# Patient Record
Sex: MALE | Race: WHITE | NOT HISPANIC OR LATINO | Employment: FULL TIME | URBAN - METROPOLITAN AREA
[De-identification: names, ages, dates, MRNs, and addresses within clinical notes are randomized per-mention and may not be internally consistent; named-entity substitution may affect disease eponyms.]

---

## 2017-10-12 ENCOUNTER — OFFICE VISIT (OUTPATIENT)
Dept: FAMILY MEDICINE | Facility: CLINIC | Age: 42
End: 2017-10-12
Payer: COMMERCIAL

## 2017-10-12 VITALS
WEIGHT: 192 LBS | SYSTOLIC BLOOD PRESSURE: 130 MMHG | TEMPERATURE: 98.8 F | HEART RATE: 67 BPM | DIASTOLIC BLOOD PRESSURE: 82 MMHG | OXYGEN SATURATION: 98 % | HEIGHT: 73 IN | BODY MASS INDEX: 25.45 KG/M2

## 2017-10-12 DIAGNOSIS — E78.1 HYPERTRIGLYCERIDEMIA: ICD-10-CM

## 2017-10-12 DIAGNOSIS — M10.061 ACUTE IDIOPATHIC GOUT OF RIGHT KNEE: ICD-10-CM

## 2017-10-12 DIAGNOSIS — M25.561 PAIN AND SWELLING OF RIGHT KNEE: Primary | ICD-10-CM

## 2017-10-12 DIAGNOSIS — M25.461 PAIN AND SWELLING OF RIGHT KNEE: Primary | ICD-10-CM

## 2017-10-12 LAB
CRP SERPL-MCNC: 10.1 MG/L (ref 0–8)
URATE SERPL-MCNC: 10.2 MG/DL (ref 3.5–7.2)

## 2017-10-12 PROCEDURE — 86140 C-REACTIVE PROTEIN: CPT | Performed by: INTERNAL MEDICINE

## 2017-10-12 PROCEDURE — 86038 ANTINUCLEAR ANTIBODIES: CPT | Performed by: INTERNAL MEDICINE

## 2017-10-12 PROCEDURE — 86618 LYME DISEASE ANTIBODY: CPT | Performed by: INTERNAL MEDICINE

## 2017-10-12 PROCEDURE — 36415 COLL VENOUS BLD VENIPUNCTURE: CPT | Performed by: INTERNAL MEDICINE

## 2017-10-12 PROCEDURE — 99214 OFFICE O/P EST MOD 30 MIN: CPT | Performed by: INTERNAL MEDICINE

## 2017-10-12 PROCEDURE — 84550 ASSAY OF BLOOD/URIC ACID: CPT | Performed by: INTERNAL MEDICINE

## 2017-10-12 RX ORDER — METHYLPREDNISOLONE 4 MG
TABLET, DOSE PACK ORAL
Qty: 21 TABLET | Refills: 2 | Status: SHIPPED | OUTPATIENT
Start: 2017-10-12 | End: 2019-07-01

## 2017-10-12 NOTE — LETTER
October 16, 2017      Stevie Black  [address]  [place] [address]            Dear Stevie Black    Lab tests show no evidence of Lyme disease or rheumatoid arthritis.   Continue Medrol Dosepak, Colchicine and Indomethacin as prescribed.      Enclosed is a copy of the results.  It was a pleasure to see you at your last appointment.    If you have any questions or concerns, please call myself or my nurse at [phone].      Sincerely,      Zachary Dale MD/BRIANA Milligan MA      Results for orders placed or performed in visit on 10/12/17   Lyme Disease Natali with reflex to WB Serum   Result Value Ref Range    Lyme Disease Antibodies Serum 0.12 0.00 - 0.89   Anti Nuclear Natali IgG by IFA with Reflex   Result Value Ref Range    GILDARDO interpretation Negative NEG^Negative   CRP, inflammation   Result Value Ref Range    CRP Inflammation 10.1 (H) 0.0 - 8.0 mg/L   Uric acid   Result Value Ref Range    Uric Acid 10.2 (H) 3.5 - 7.2 mg/dL

## 2017-10-12 NOTE — NURSING NOTE
"Chief Complaint   Patient presents with     Musculoskeletal Problem     right knee pain       Initial /82 (BP Location: Left arm, Patient Position: Chair, Cuff Size: Adult Regular)  Pulse 67  Temp 98.8  F (37.1  C) (Oral)  Ht 6' 0.75\" (1.848 m)  Wt 192 lb (87.1 kg)  SpO2 98%  BMI 25.51 kg/m2 Estimated body mass index is 25.51 kg/(m^2) as calculated from the following:    Height as of this encounter: 6' 0.75\" (1.848 m).    Weight as of this encounter: 192 lb (87.1 kg).  Medication Reconciliation: complete     Pa Sean Milligan MA      "

## 2017-10-12 NOTE — Clinical Note
Lipid panel 10/11/2017 (Biometric screening at work) Total cholesterol=239 HDL=35 Triglycerides=427 LDL=cannot be calculated Non HDL cholesterol=204 Total cholesterol/HDL ratio=6.7

## 2017-10-12 NOTE — PROGRESS NOTES
SUBJECTIVE:   Stevie Black is a 42 year old male who presents to clinic today for the following health issues:    Joint Pain    Onset: 2-3 weeks    Description: Woke up with swelling and pain of right knee.   Location: right knee  Character: pressure    Intensity: mild    Progression of Symptoms: worse, especially when kneeling or flexing knee.    Accompanying Signs & Symptoms:  Other symptoms: weakness of right knee and swelling    History:   Previous similar pain: no       Precipitating factors: History of gout 5 years ago. No maintenance treatment.  Trauma or overuse: no     Alleviating factors:             None  Therapies Tried and outcome: ibuprofen, no relief      Problem list and histories reviewed & adjusted, as indicated.  Additional history: as documented    Patient Active Problem List   Diagnosis     CARDIOVASCULAR SCREENING; LDL GOAL LESS THAN 160     Gout     Infectious mononucleosis     Asymptomatic cholelithiasis     Hypertriglyceridemia     History reviewed. No pertinent surgical history.    Social History   Substance Use Topics     Smoking status: Never Smoker     Smokeless tobacco: Never Used     Alcohol use Yes      Comment: socially     Family History   Problem Relation Age of Onset     CANCER Maternal Grandmother      CANCER Maternal Grandfather          No Known Allergies  Recent Labs   Lab Test  11/25/14   1047  11/14/14   0729   ALT  67  401*   CR   --   1.00   GFRESTIMATED   --   83   GFRESTBLACK   --   >90   GFR Calc     POTASSIUM   --   4.3      BP Readings from Last 3 Encounters:   10/12/17 130/82   06/06/16 107/72   12/05/14 124/70    Wt Readings from Last 3 Encounters:   10/12/17 192 lb (87.1 kg)   06/06/16 183 lb 6.4 oz (83.2 kg)   12/05/14 165 lb 9.6 oz (75.1 kg)                 ROS:  C: NEGATIVE for fever, chills, change in weight  I: NEGATIVE for worrisome rashes, moles or lesions  E: NEGATIVE for vision changes or irritation  E/M: NEGATIVE for ear, mouth and  "throat problems  R: NEGATIVE for significant cough or SOB  CV: NEGATIVE for chest pain, palpitations or peripheral edema  GI: NEGATIVE for nausea, abdominal pain, heartburn, or change in bowel habits  : NEGATIVE for frequency, dysuria, or hematuria  MUSCULOSKELETAL:As above.  N: NEGATIVE for weakness, dizziness or paresthesias  E: NEGATIVE for temperature intolerance, skin/hair changes  H: NEGATIVE for bleeding problems  P: NEGATIVE for changes in mood or affect    OBJECTIVE:     /82 (BP Location: Left arm, Patient Position: Chair, Cuff Size: Adult Regular)  Pulse 67  Temp 98.8  F (37.1  C) (Oral)  Ht 6' 0.75\" (1.848 m)  Wt 192 lb (87.1 kg)  SpO2 98%  BMI 25.51 kg/m2  Body mass index is 25.51 kg/(m^2).  GENERAL: healthy, alert and no distress  EYES: Eyes grossly normal to inspection and conjunctivae and sclerae normal  HENT: normal cephalic/atraumatic and oral mucous membranes moist  CV: regular rate and rhythm, normal S1 S2, no S3 or S4, no murmur, click or rub, no peripheral edema and peripheral pulses strong  MS: Swelling of right knee, non-tender on palpation without limited active ROM.  SKIN: no suspicious lesions or rashes  NEURO: Normal strength and tone, mentation intact and speech normal  PSYCH: mentation appears normal, affect normal/bright    Diagnostic Test Results:  Results for orders placed or performed in visit on 10/12/17 (from the past 24 hour(s))   CRP, inflammation   Result Value Ref Range    CRP Inflammation 10.1 (H) 0.0 - 8.0 mg/L   Uric acid   Result Value Ref Range    Uric Acid 10.2 (H) 3.5 - 7.2 mg/dL       ASSESSMENT/PLAN:     (M25.561,  M25.461) Pain and swelling of right knee  (primary encounter diagnosis)  Comment: Multifactorial. No preceding trauma. Overuse is possible due to years of playing softball.  Plan: MR Knee Right w/o Contrast, ORTHOPEDICS ADULT         REFERRAL, methylPREDNISolone (MEDROL DOSEPAK) 4        MG tablet, Lyme Disease Natali with reflex to WB         " Serum, Anti Nuclear Natali IgG by IFA with Reflex,        CRP, inflammation, Uric acid            (E78.1) Hypertriglyceridemia  Comment: Noted from recent biometric screening at work.  Plan: Start over-the-counter fish oil capsules.    (M10.061) Acute idiopathic gout of right knee  Comment: Associated with high uric acid and high CRP level. Start off with Medrol Dosepak followed by 15 days of Colchicine plus Indomethacine.  Plan: colchicine (COLCRYS) 0.6 MG tablet,         indomethacin (INDOCIN) 50 MG capsule              Follow-up visit if condition worsens.    Zachary Dale MD  [facility]

## 2017-10-12 NOTE — MR AVS SNAPSHOT
After Visit Summary   10/12/2017    Stevie Black    MRN: [id number]           Patient Information     Date Of Birth          1975        Visit Information        Provider Department      10/12/2017 2:40 PM Zachary Dale MD [facility]        Today's Diagnoses     Pain and swelling of right knee    -  1      Care Instructions    This summary includes the important diagnoses, test, medications and other important parts of your medical history.  Below are a few good we sites you can use to learn more about these.     Www.Wecash.Semant.io : Up to date and easily searchable information on multiple topics.  Www.Wecash.Semant.io/Pharmacy/c_539084.asp : [facility] $4.99 medications  Www.Forter.gov : medication info, interactive tutorials, watch real surgeries online  Www.familydoctor.org : good info from the Academy of Family Physicians  Www.TableConnect GmbH.Angstro : good info from the [facility]  Www.cdc.gov : public health info, travel advisories, epidemics (H1N1)  Www.aap.org : children's health info, normal development, vaccinations  Www.health.[facility].[place].us : [place] dept of heatlh, public health issues in [place], N1N1    Based on your medical history and these are the current health maintenance or preventive care services that you are due for (some may have been done at this visit:)  Health Maintenance Due   Topic Date Due     LIPID SCREEN Q5 YR MALE (SYSTEM ASSIGNED)  02/10/2010     INFLUENZA VACCINE (SYSTEM ASSIGNED)  09/01/2017     =================================================================================  Normal Values   Blood pressure  <140/90 for most adults    <130/80 for some chronic diseases (ask your care team about yours)    BMI (body mass index)  18.5-25 kg/m2 (based on height and weight)     Thank you for visiting [facility]    Normal or non-critical lab and imaging results will be communicated to you by MyChart, letter or phone within 7  days.  If you do not hear from us within 10 days, please call the clinic. If you have a critical or abnormal lab result, we will notify you by phone as soon as possible.     If you have any questions regarding your visit please contact:     Team Comfort:   Clinic Hours Telephone Number   Dr. Babar Burton  Melissa Hartev   7am-5pm  Monday - Friday [phone]  Hansel MATIAS   Pharmacy 8am-8pm Monday-Thursday      8am-6pm Friday  9am-5pm Saturday-Sunday [phone]   Urgent Care 11am-8pm Monday-Friday        9am-5pm Saturday-Sunday [phone]     After hours, weekend or if you need to make an appointment with your primary provider please call [phone].   After Hours nurse advise: call [facility]: [phone]    Medication Refills:  Call your pharmacy and they will forward the refill to us. Please allow 3 business days for your refills to be completed.    Use Fit Fugitives (secure email communication and access to your chart) to send your primary care provider a message or make an appointment. Ask someone on your Team how to sign up for Fit Fugitives. To log on to Covercake or for more information in [place] please visit the website at [email or web address].  As of October 8, 2013, all password changes, disabled accounts, or ID changes in Fit Fugitives/MyHealth will be done by our Access Services Department.   If you need help with your account or password, call: [phone]. Clinic staff no longer has the ability to change passwords.             Follow-ups after your visit        Additional Services     ORTHOPEDICS ADULT REFERRAL       Your provider has referred you to: [facility]: [facility] [phone]    [email or web address]    Please be aware that coverage of these services is subject to the terms and limitations of your health insurance plan.  Call member services at your health plan with  "any benefit or coverage questions.      Please bring the following to your appointment:    >>   Any x-rays, CTs or MRIs which have been performed.  Contact the facility where they were done to arrange for  prior to your scheduled appointment.    >>   List of current medications   >>   This referral request   >>   Any documents/labs given to you for this referral                  Future tests that were ordered for you today     Open Future Orders        Priority Expected Expires Ordered    MR Knee Right w/o Contrast Routine  10/12/2018 10/12/2017    ORTHOPEDICS ADULT REFERRAL Routine  10/12/2018 10/12/2017            Who to contact     If you have questions or need follow up information about today's clinic visit or your schedule please contact [facility] directly at [phone].  Normal or non-critical lab and imaging results will be communicated to you by MyChart, letter or phone within 4 business days after the clinic has received the results. If you do not hear from us within 7 days, please contact the clinic through Vertive ([email or web address])hart or phone. If you have a critical or abnormal lab result, we will notify you by phone as soon as possible.  Submit refill requests through India Property Online or call your pharmacy and they will forward the refill request to us. Please allow 3 business days for your refill to be completed.          Additional Information About Your Visit        Vertive ([email or web address])harScreenie Information     India Property Online lets you send messages to your doctor, view your test results, renew your prescriptions, schedule appointments and more. To sign up, go to [email or web address] . Click on \"Log in\" on the left side of the screen, which will take you to the Welcome page. Then click on \"Sign up Now\" on the right side of the page.     You will be asked to enter the access code listed below, as well as some personal information. Please follow the directions to create your username and password.     Your access code is: " "[email or web address]  Expires: 1/10/2018  4:00 PM     Your access code will  in 90 days. If you need help or a new code, please call your [facility] clinic or [phone].        Care EveryWhere ID     This is your Care EveryWhere ID. This could be used by other organizations to access your [facility] medical records  [id number]        Your Vitals Were     Pulse Temperature Height Pulse Oximetry BMI (Body Mass Index)       67 98.8  F (37.1  C) (Oral) 6' 0.75\" (1.848 m) 98% 25.51 kg/m2        Blood Pressure from Last 3 Encounters:   10/12/17 130/82   16 107/72   14 124/70    Weight from Last 3 Encounters:   10/12/17 192 lb (87.1 kg)   16 183 lb 6.4 oz (83.2 kg)   14 165 lb 9.6 oz (75.1 kg)              We Performed the Following     Anti Nuclear Natali IgG by IFA with Reflex     Lyme Disease Natali with reflex to WB Serum          Today's Medication Changes          These changes are accurate as of: 10/12/17  4:00 PM.  If you have any questions, ask your nurse or doctor.               Start taking these medicines.        Dose/Directions    methylPREDNISolone 4 MG tablet   Commonly known as:  MEDROL DOSEPAK   Used for:  Pain and swelling of right knee   Started by:  Zachary Dale MD        Follow package instructions   Quantity:  21 tablet   Refills:  2            Where to get your medicines      These medications were sent to [id number] [place] [id number] [address] [address]     Phone:  [phone]     methylPREDNISolone 4 MG tablet                Primary Care Provider    None Specified       No primary provider on file.        Equal Access to Services     [facility] AH: Tiffanie Petersen, wamiriamda lushyadaha, qaybta kaalmada jennifer, cornelio mahoney. [facility] [phone].    ATENCIÓN: Si habla español, tiene a banegas disposición servicios gratuitos de asistencia lingüística. Llame al " [phone].    We comply with applicable federal civil rights laws and [place] laws. We do not discriminate on the basis of race, color, national origin, age, disability, sex, sexual orientation, or gender identity.            Thank you!     Thank you for choosing [facility]  for your care. Our goal is always to provide you with excellent care. Hearing back from our patients is one way we can continue to improve our services. Please take a few minutes to complete the written survey that you may receive in the mail after your visit with us. Thank you!             Your Updated Medication List - Protect others around you: Learn how to safely use, store and throw away your medicines at www.disposemymeds.org.          This list is accurate as of: 10/12/17  4:00 PM.  Always use your most recent med list.                   Brand Name Dispense Instructions for use Diagnosis    fluticasone 50 MCG/ACT spray    FLONASE    3 Package    Spray 1-2 sprays into both nostrils daily    Allergic rhinitis       ibuprofen 200 MG capsule     120 capsule    600 mg (3 tablets) po every 6 hours prn elbow and thumb pain    Thumb pain, left, Medial epicondylitis of elbow, right       methylPREDNISolone 4 MG tablet    MEDROL DOSEPAK    21 tablet    Follow package instructions    Pain and swelling of right knee

## 2017-10-12 NOTE — PATIENT INSTRUCTIONS
This summary includes the important diagnoses, test, medications and other important parts of your medical history.  Below are a few good we sites you can use to learn more about these.     Www.FiFully.org : Up to date and easily searchable information on multiple topics.  Www.FiFully.org/Pharmacy/c_539084.asp : [facility] $4.99 medications  Www.medlineplus.gov : medication info, interactive tutorials, watch real surgeries online  Www.familydoctor.org : good info from the Academy of Family Physicians  Www.CO-Valueinic.ProtonMedia : good info from the [facility]  Www.cdc.gov : public health info, travel advisories, epidemics (H1N1)  Www.aap.org : children's health info, normal development, vaccinations  Www.health.[facility].[place].us : [place] dept of heat, public health issues in [place], N1N1    Based on your medical history and these are the current health maintenance or preventive care services that you are due for (some may have been done at this visit:)  Health Maintenance Due   Topic Date Due     LIPID SCREEN Q5 YR MALE (SYSTEM ASSIGNED)  02/10/2010     INFLUENZA VACCINE (SYSTEM ASSIGNED)  09/01/2017     =================================================================================  Normal Values   Blood pressure  <140/90 for most adults    <130/80 for some chronic diseases (ask your care team about yours)    BMI (body mass index)  18.5-25 kg/m2 (based on height and weight)     Thank you for visiting [facility]    Normal or non-critical lab and imaging results will be communicated to you by MyChart, letter or phone within 7 days.  If you do not hear from us within 10 days, please call the clinic. If you have a critical or abnormal lab result, we will notify you by phone as soon as possible.     If you have any questions regarding your visit please contact:     Team Comfort:   Clinic Hours Telephone Number   Dr. Babar Torres  [facility]   7am-5pm  Monday - Friday [phone]  Hansel MATIAS   Pharmacy 8am-8pm Monday-Thursday      8am-6pm Friday  9am-5pm Saturday-Sunday [phone]   Urgent Care 11am-8pm Monday-Friday        9am-5pm Saturday-Sunday [phone]     After hours, weekend or if you need to make an appointment with your primary provider please call [phone].   After Hours nurse advise: call [facility]: [phone]    Medication Refills:  Call your pharmacy and they will forward the refill to us. Please allow 3 business days for your refills to be completed.    Use LivePerson (secure email communication and access to your chart) to send your primary care provider a message or make an appointment. Ask someone on your Team how to sign up for LivePerson. To log on to PayMins or for more information in [place] please visit the website at [email or web address].  As of October 8, 2013, all password changes, disabled accounts, or ID changes in LivePerson/MyHealth will be done by our Access Services Department.   If you need help with your account or password, call: [phone]. Clinic staff no longer has the ability to change passwords.

## 2017-10-13 ENCOUNTER — TELEPHONE (OUTPATIENT)
Dept: FAMILY MEDICINE | Facility: CLINIC | Age: 42
End: 2017-10-13

## 2017-10-13 LAB
ANA SER QL IF: NEGATIVE
B BURGDOR IGG+IGM SER QL: 0.12 (ref 0–0.89)

## 2017-10-13 RX ORDER — INDOMETHACIN 50 MG/1
50 CAPSULE ORAL 2 TIMES DAILY WITH MEALS
Qty: 30 CAPSULE | Refills: 1 | Status: SHIPPED | OUTPATIENT
Start: 2017-10-13 | End: 2019-07-01

## 2017-10-13 RX ORDER — COLCHICINE 0.6 MG/1
0.6 TABLET ORAL 2 TIMES DAILY
Qty: 30 TABLET | Refills: 0 | Status: SHIPPED | OUTPATIENT
Start: 2017-10-13 | End: 2019-07-01

## 2017-10-13 NOTE — TELEPHONE ENCOUNTER
.Patient returned call    Best number to reach them: Home number on file [phone] (home)    Is it ok to leave a detailed message: YES

## 2017-10-13 NOTE — TELEPHONE ENCOUNTER
I suggest waiting for MRI results.  He will be notified whether Ortho consultation is still necessary, depending on his MRI>

## 2017-10-13 NOTE — TELEPHONE ENCOUNTER
Patient contacted and informed of the below per provider documentation. Patient verbalizes understanding.     Patient asking if he still needs to follow up with Ortho or wait for MRI results?  Ok to leave detailed message with provider's response.     Routing to provider to review and advise.   Paige Ventura RN

## 2017-10-13 NOTE — TELEPHONE ENCOUNTER
This writer attempted to contact Stevie on 10/13/17      Reason for call results and left message to return call.      If patient calls back:   Patient contacted by clinic RN team. Inform patient that someone from the team will contact them, document that pt called and route to care team. .        Hansel Shaffer RN

## 2017-10-28 ENCOUNTER — NURSE TRIAGE (OUTPATIENT)
Dept: NURSING | Facility: CLINIC | Age: 42
End: 2017-10-28

## 2017-10-28 NOTE — TELEPHONE ENCOUNTER
Reason for Disposition    Localized rash present > 7 days     Stevie was seen for gout and was started on two new medications.  He reports that last week he developed a red rash on both his left and right forearms that itches at times.    He denies hiving or other issues.      Warm transfer to scheduling to make an appointment.    Additional Information    Negative: [1] Sudden onset of rash (within last 2 hours) AND [2] difficulty with breathing or swallowing    Negative: Sounds like a life-threatening emergency to the triager    Negative: Poison ivy, oak, or sumac contact suspected    Negative: Insect bite(s) suspected    Negative: Ringworm suspected (i.e., round pink patch, sometimes looks like ring, usually 1/2 to 1 inch [12-25 mm],  in size, slowly increasing in size)    Negative: Athlete's Foot suspected (i.e., itchy rash between the toes)    Negative: Jock Itch suspected (i.e., itchy rash on inner thighs near genital area)    Negative: Wound infection suspected (i.e., pain, spreading redness, or pus; in a cut, puncture, scrape or sutured wound)    Negative: Impetigo suspected  (i.e., painless infected superficial small sores, less than 1 inch or 2.5 cm, often covered by a soft, yellow-brown scab or crust; sometimes occurring near nasal openings)    Negative: Shingles suspected (i.e., painful rash, multiple small blisters grouped together in one area of body; dermatomal distribution)    Negative: Rash of external female genital area (vulva)    Negative: Rash of penis or scrotum    Negative: Small spot, skin growth, or mole    Negative: Sores or skin ulcer, not a rash    Negative: Localized lump (or swelling) without redness or rash    Negative: [1] Localized purple or blood-colored spots or dots AND [2] not from injury or friction AND [3] fever    Negative: Patient sounds very sick or weak to the triager    Negative: [1] Red area or streak AND [2] fever    Negative: [1] Rash is painful to touch AND [2]  fever    Negative: [1] Looks infected (spreading redness, pus) AND [2] large red area (> 2 in. or 5 cm)    Negative: [1] Looks infected (spreading redness, pus) AND [2] diabetes mellitus or weak immune system (e.g., HIV positive,  cancer chemotherapy, chronic steroid treatment, splenectomy)    Negative: [1] Localized purple or blood-colored spots or dots AND [2] not from injury or friction AND [3] no fever    Negative: [1] Looks infected (spreading redness, pus) AND [2] no fever    Negative: Looks like a boil, infected sore, deep ulcer or other infected rash    Negative: [1] Localized rash is very painful AND [2] no fever    Negative: Genital area rash    Negative: Lyme disease suspected (e.g., bull's eye rash or tick bite / exposure)    Negative: [1] Applying cream or ointment AND [2] causes severe itch, burning or pain    Negative: [1] Pimples (localized) AND [2 ] NO improvement after using Care Advice    Negative: Tender bumps in armpits    Negative: [1] Severe localized itching AND [2] after 2 days of steroid cream and antihistamines    Protocols used: RASH OR REDNESS - LOCALIZED-ADULT-      Ade Bah RN  [facility]

## 2019-01-01 NOTE — TELEPHONE ENCOUNTER
Notes Recorded by Zachary Dale MD on 10/12/2017 at 10:20 PM  Serum uric acid level is high, associated with high CRP, which suggests acute gout of the right knee.  Continue Medrol Dosepak as prescribed. After finishing Medrol Dosepak x 6 days, start both Colchicine 0.6 mg BID x 14 days and  Indomethacin 50 mg BID x 14 days.  Rx for both Colchicine and Indomethacin sent to patient's pharmacy.  Keep MRI appointment on 10/18/17.    (Call patient)            Patient states no history

## 2019-07-01 ENCOUNTER — OFFICE VISIT (OUTPATIENT)
Dept: FAMILY MEDICINE | Facility: CLINIC | Age: 44
End: 2019-07-01
Payer: COMMERCIAL

## 2019-07-01 VITALS
WEIGHT: 191 LBS | SYSTOLIC BLOOD PRESSURE: 118 MMHG | OXYGEN SATURATION: 100 % | HEART RATE: 66 BPM | BODY MASS INDEX: 25.31 KG/M2 | TEMPERATURE: 98.6 F | HEIGHT: 73 IN | DIASTOLIC BLOOD PRESSURE: 69 MMHG

## 2019-07-01 DIAGNOSIS — M10.9 ACUTE GOUT OF RIGHT KNEE, UNSPECIFIED CAUSE: Primary | ICD-10-CM

## 2019-07-01 LAB — URATE SERPL-MCNC: 9.3 MG/DL (ref 3.5–7.2)

## 2019-07-01 PROCEDURE — 84550 ASSAY OF BLOOD/URIC ACID: CPT | Performed by: NURSE PRACTITIONER

## 2019-07-01 PROCEDURE — 99213 OFFICE O/P EST LOW 20 MIN: CPT | Performed by: NURSE PRACTITIONER

## 2019-07-01 PROCEDURE — 36415 COLL VENOUS BLD VENIPUNCTURE: CPT | Performed by: NURSE PRACTITIONER

## 2019-07-01 RX ORDER — INDOMETHACIN 50 MG/1
50 CAPSULE ORAL
Qty: 21 CAPSULE | Refills: 0 | Status: SHIPPED | OUTPATIENT
Start: 2019-07-01 | End: 2019-07-16

## 2019-07-01 ASSESSMENT — MIFFLIN-ST. JEOR: SCORE: 1810.25

## 2019-07-01 ASSESSMENT — PAIN SCALES - GENERAL: PAINLEVEL: MILD PAIN (3)

## 2019-07-01 NOTE — PATIENT INSTRUCTIONS
Start indomethacin - I will send to the pharmacy. Do not take ibuprofen/Advil/naproxen/Aleve while taking this medication. Avoid alcohol and foods high in purine (red meat, turkey and wild game, organ meats, seafood) as well as drinks/foods with high fructose corn syrup  Follow up if not improving in 3-5 days        Patient Education     Gout    Gout is an inflammation of a joint due to a build-up of gout crystals in the joint fluid. This occurs when there is an excess of uric acid (a normal waste product) in the body. Uric acid builds up in the body when the kidneys are unable to filter enough of it from the blood. This may occur with age. It is also associated with kidney disease. Gout occurs more often in people with obesity, diabetes, high blood pressure, or high levels of fats in the blood. It may run in families. Gout tends to come and go. A flare up of gout is called an attack. Drinking alcohol or eating certain foods (such as shellfish or foods with additives such as high-fructose corn syrup) may increase uric acid levels in the blood and cause a gout attack.  During a gout attack, the affected joint may become a hot, red, swollen and painful. If you have had one attack of gout, you are likely to have another. An attack of gout can be treated with medicine. If these attacks become frequent, a daily medicine may be prescribed to help the kidneys remove uric acid from the body.  Home care  During a gout attack:    Rest painful joints. If gout affects the joints of your foot or leg, you may want to use crutches for the first few days to keep from bearing weight on the affected joint.    When sitting or lying down, raise the painful joint to a level higher than your heart.    Apply an ice pack (ice cubes in a plastic bag wrapped in a thin towel) over the injured area for 20 minutes every 1 to 2 hours the first day for pain relief. Continue this 3 to 4 times a day for swelling and pain.    Avoid alcohol and foods  listed below (see Preventing attacks) during a gout attack. Drink extra fluid to help flush the uric acid through your kidneys.    If you were prescribed a medicine to treat gout, take it as your healthcare provider has instructed. Don't skip doses.    Take anti-inflammatory medicine as directed.     If pain medicines have been prescribed, take them exactly as directed.    Preventing attacks    Minimize or avoid alcohol use. Excess alcohol intake can cause a gout attack.    Limit these foods and beverages:  ? Organ meats, such as kidneys and liver  ? Certain seafoods (anchovies, sardines, shrimp, scallops, herring, mackerel)  ? Wild game, meat extracts and meat gravies  ? Foods and beverages sweetened with high-fructose corn syrup, such as sodas    Eat a healthy diet including low-fat and nonfat dairy, whole grains, and vegetables.    If you are overweight, talk to your healthcare provider about a weight reduction plan. Avoid fasting or extreme low calorie diets (less than 900 calories per day). This will increase uric acid levels in the body.    If you have diabetes or high blood pressure, work with your doctor to manage these conditions.    Protect the joint from injury. Trauma can trigger a gout attack.  Follow-up care  Follow up with your healthcare provider, or as advised.   When to seek medical advice  Call your healthcare provider if you have any of the following:    Fever over 100.4 F (38. C) with worsening joint pain    Increasing redness around the joint    Pain developing in another joint    Repeated vomiting, abdominal pain, or blood in the vomit or stool (black or red color)  Date Last Reviewed: 3/1/2017    3043-9987 [employer]. [address] [place] [address]. All rights reserved. This information is not intended as a substitute for professional medical care. Always follow your healthcare professional's instructions.           Patient Education     Gout Diet  Gout is a painful  condition caused by an excess of uric acid, a waste product made by the body. Uric acid forms crystals that collect in the joints. The immune response to these crystals brings on symptoms of joint pain and swelling. This is called a gout attack. Often, medications and diet changes are combined to manage gout. Below are some guidelines for changing your diet to help you manage gout and prevent attacks. Your healthcare provider will help you determine the best eating plan for you.  Eating to manage gout  Weight loss for those who are overweight may help reduce gout attacks.  Eat less of these foods  Eating too many foods containing purines may raise the levels of uric acid in your body. This raises your risk for a gout attack. Try to limit these foods and drinks:    Alcohol, such as beer and red wine. You may be told to avoid alcohol completely.    Soft drinks that contain sugar or high fructose corn syrup    Certain fish, including anchovies, sardines, fish eggs, and herring    Shellfish    Certain meats, such as red meat, hot dogs, luncheon meats, and turkey    Organ meats, such as liver, kidneys, and sweetbreads    Legumes, such as dried beans and peas    Other high fat foods such as gravy, whole milk, and high fat cheeses    Vegetables such as asparagus, cauliflower, spinach, and mushrooms used to be thought to contribute to an increased risk for a gout attack, but recent studies show that high purine vegetables don't increase the risk for a gout attack.  Eat more of these foods  Other foods may be helpful for people with gout. Add some of these foods to your diet:    Cherries contain chemicals that may lower uric acid.    Omega fatty acids. These are found in some fatty fish such as salmon, certain oils (flax, olive, or nut), and nuts themselves. Omega fatty acids may help prevent inflammation due to gout.    Dairy products that are low-fat or fat-free, such as cheese and yogurt    Complex carbohydrate foods,  including whole grains, brown rice, oats, and beans    Coffee, in moderation    Water, approximately 64 ounces per day  Follow-up care  Follow up with your healthcare provider as advised.  When to seek medical advice  Call your healthcare provider right away if any of these occur:    Return of gout symptoms, usually at night:    Severe pain, swelling, and heat in a joint, especially the base of the big toe    Affected joint is hard to move    Skin of the affected joint is purple or red    Fever of 100.4 F (38 C) or higher    Pain that doesn't get better even with prescribed medicine   Date Last Reviewed: 6/1/2018 2000-2018 [employer]. [address] [place] [address], [place] [address]. All rights reserved. This information is not intended as a substitute for professional medical care. Always follow your healthcare professional's instructions.

## 2019-07-01 NOTE — PROGRESS NOTES
"Subjective     Stevie Black is a 44 year old male who presents to clinic today for the following health issues:    HPI   Gout/ single inflamed joint   Onset: about 1 week ago    Description:   Location: knee - right  Joint Swelling: YES  Redness: no   Pain: YES    Intensity: 9/10 when he bend knee    Progression of Symptoms:  improving    Accompanying Signs & Symptoms:  Fevers: no     History:   Trauma to the area: no   Previous history of gout: YES   Recent illness:  no     Precipitating factors:   Diet-rich in purine: alcohol  Alcohol use: YES  Diuretic use: no     Alleviating factors:  none    Therapies Tried and outcome: Advil      Hurts to bend/crouch  Feels like last time he had gout  Drinks 3 beers twice per week  Gout seems to be more of an issue in the summer because drinking more beer  Some meat but not a whole lot  Taking advil 800 mg daily - seems to help a bit  Tenderness \"inside\" knee    Patient Active Problem List   Diagnosis     CARDIOVASCULAR SCREENING; LDL GOAL LESS THAN 160     Gout     Infectious mononucleosis     Asymptomatic cholelithiasis     Hypertriglyceridemia     History reviewed. No pertinent surgical history.    Social History     Tobacco Use     Smoking status: Never Smoker     Smokeless tobacco: Never Used   Substance Use Topics     Alcohol use: Yes     Comment: socially     Family History   Problem Relation Age of Onset     Cancer Maternal Grandmother      Cancer Maternal Grandfather          Current Outpatient Medications   Medication Sig Dispense Refill     fluticasone (FLONASE) 50 MCG/ACT nasal spray Spray 1-2 sprays into both nostrils daily 3 Package 1     ibuprofen 200 MG capsule 600 mg (3 tablets) po every 6 hours prn elbow and thumb pain 120 capsule 1     indomethacin (INDOCIN) 50 MG capsule Take 1 capsule (50 mg) by mouth 3 times daily (with meals) for 7 days 21 capsule 0     No Known Allergies      Reviewed and updated as needed this visit by Provider  Tobacco  " "Allergies  Meds  Problems  Med Hx  Surg Hx  Fam Hx         Review of Systems   ROS COMP: Constitutional, HEENT, cardiovascular, pulmonary, gi and gu systems are negative, except as otherwise noted.      Objective    /69 (BP Location: Left arm, Patient Position: Chair, Cuff Size: Adult Large)   Pulse 66   Temp 98.6  F (37  C) (Oral)   Ht 1.854 m (6' 1\")   Wt 86.6 kg (191 lb)   SpO2 100%   BMI 25.20 kg/m    Body mass index is 25.2 kg/m .  Physical Exam   GENERAL: healthy, alert and no distress  MS: swelling to right knee otherwise normal knee exam  SKIN: no suspicious lesions or rashes  PSYCH: mentation appears normal, affect normal/bright    Diagnostic Test Results:  No results found for this or any previous visit (from the past 24 hour(s)).        Assessment & Plan     1. Acute gout of right knee, unspecified cause  Start indomethacin - I will send to the pharmacy. Do not take ibuprofen/Advil/naproxen/Aleve while taking this medication. Avoid alcohol and foods high in purine (red meat, turkey and wild game, organ meats, seafood) as well as drinks/foods with high fructose corn syrup  Follow up if not improving in 3-5 days  Discussed cutting back alcohol.  Could consider allopurinol in the future  - Uric acid  - indomethacin (INDOCIN) 50 MG capsule; Take 1 capsule (50 mg) by mouth 3 times daily (with meals) for 7 days  Dispense: 21 capsule; Refill: 0    Due for jamar maintenance exam     BMI:   Estimated body mass index is 25.2 kg/m  as calculated from the following:    Height as of this encounter: 1.854 m (6' 1\").    Weight as of this encounter: 86.6 kg (191 lb).           See Patient Instructions    Return in about 5 days (around 7/6/2019), or if symptoms worsen or fail to improve.     The benefits, risks and potential side effects were discussed in detail. Black box warnings discussed as relevant. All patient questions were answered. The patient was instructed to follow up immediately if any " adverse reactions develop.    Return precautions discussed, including when to seek urgent/emergent care.    Patient verbalizes understanding and agrees with plan of care. Patient stable for discharge.      RYLIE Pardo CNP  [facility]

## 2019-07-02 NOTE — RESULT ENCOUNTER NOTE
Please call patient and let them know their lab result was abnormal.  Uric acid elevated. Continue plan of care as discussed during our visit. Follow up with primary care provider in 4-6 weeks to discuss if prophylactic medication would be an option for you.

## 2019-07-03 ENCOUNTER — TELEPHONE (OUTPATIENT)
Dept: FAMILY MEDICINE | Facility: CLINIC | Age: 44
End: 2019-07-03

## 2019-07-03 NOTE — TELEPHONE ENCOUNTER
Reason for Call:  Other Results    Detailed comments: Pt returning phone call and was transferred to a Triage Nurse.    Phone Number Patient can be reached at: Home number on file [phone] (home)    Best Time: anytime    Can we leave a detailed message on this number? YES    Call taken on 7/3/2019 at 1:17 PM by Nathan Castañeda

## 2019-07-03 NOTE — TELEPHONE ENCOUNTER
Patient returned call, transferred to RN line by call center.  Writer took call.    Spoke with patient, advised of results and provider plan. Patient verbalized understanding and agreed. Will continue with current treatment and will follow up in about 1-2 months for further discussion on possibly starting on daily prophylactic medication.  No questions at this time.    Isabell Ceja RN

## 2019-07-03 NOTE — TELEPHONE ENCOUNTER
Notes recorded by Ngozi Solis APRN CNP on 7/2/2019 at 5:25 PM CDT  Please call patient and let them know their lab result was abnormal.  Uric acid elevated. Continue plan of care as discussed during our visit. Follow up with primary care provider in 4-6 weeks to discuss if prophylactic medication would be an option for you.    This writer attempted to contact Stevie on 07/03/19      Reason for call abnormal lab results and left message.      If patient calls back:   Registered Nurse called. Follow Triage Call workflow          Bimal Mack RN, BSN, PHN

## 2019-07-16 ENCOUNTER — OFFICE VISIT (OUTPATIENT)
Dept: FAMILY MEDICINE | Facility: CLINIC | Age: 44
End: 2019-07-16
Payer: COMMERCIAL

## 2019-07-16 VITALS
BODY MASS INDEX: 25.05 KG/M2 | WEIGHT: 189 LBS | RESPIRATION RATE: 16 BRPM | DIASTOLIC BLOOD PRESSURE: 75 MMHG | HEART RATE: 64 BPM | OXYGEN SATURATION: 100 % | SYSTOLIC BLOOD PRESSURE: 116 MMHG | TEMPERATURE: 98.2 F | HEIGHT: 73 IN

## 2019-07-16 DIAGNOSIS — M10.9 GOUT, UNSPECIFIED CAUSE, UNSPECIFIED CHRONICITY, UNSPECIFIED SITE: Primary | ICD-10-CM

## 2019-07-16 PROCEDURE — 99213 OFFICE O/P EST LOW 20 MIN: CPT | Performed by: NURSE PRACTITIONER

## 2019-07-16 RX ORDER — INDOMETHACIN 50 MG/1
50 CAPSULE ORAL
Qty: 21 CAPSULE | Refills: 0 | Status: SHIPPED | OUTPATIENT
Start: 2019-07-16 | End: 2019-07-16

## 2019-07-16 RX ORDER — ALLOPURINOL 100 MG/1
100 TABLET ORAL DAILY
Status: CANCELLED | OUTPATIENT
Start: 2019-08-16

## 2019-07-16 RX ORDER — INDOMETHACIN 50 MG/1
50 CAPSULE ORAL
Qty: 21 CAPSULE | Refills: 3 | Status: SHIPPED | OUTPATIENT
Start: 2019-07-16 | End: 2020-07-21

## 2019-07-16 ASSESSMENT — MIFFLIN-ST. JEOR: SCORE: 1801.18

## 2019-07-16 ASSESSMENT — PAIN SCALES - GENERAL: PAINLEVEL: NO PAIN (0)

## 2019-07-16 NOTE — PATIENT INSTRUCTIONS
Indomethacin for gout flares  If you find that you're using it more than 3 times in a 1 year, we should consider allopurinol or colchicine           Patient Education     Gout    Gout is an inflammation of a joint due to a build-up of gout crystals in the joint fluid. This occurs when there is an excess of uric acid (a normal waste product) in the body. Uric acid builds up in the body when the kidneys are unable to filter enough of it from the blood. This may occur with age. It is also associated with kidney disease. Gout occurs more often in people with obesity, diabetes, high blood pressure, or high levels of fats in the blood. It may run in families. Gout tends to come and go. A flare up of gout is called an attack. Drinking alcohol or eating certain foods (such as shellfish or foods with additives such as high-fructose corn syrup) may increase uric acid levels in the blood and cause a gout attack.  During a gout attack, the affected joint may become a hot, red, swollen and painful. If you have had one attack of gout, you are likely to have another. An attack of gout can be treated with medicine. If these attacks become frequent, a daily medicine may be prescribed to help the kidneys remove uric acid from the body.  Home care  During a gout attack:    Rest painful joints. If gout affects the joints of your foot or leg, you may want to use crutches for the first few days to keep from bearing weight on the affected joint.    When sitting or lying down, raise the painful joint to a level higher than your heart.    Apply an ice pack (ice cubes in a plastic bag wrapped in a thin towel) over the injured area for 20 minutes every 1 to 2 hours the first day for pain relief. Continue this 3 to 4 times a day for swelling and pain.    Avoid alcohol and foods listed below (see Preventing attacks) during a gout attack. Drink extra fluid to help flush the uric acid through your kidneys.    If you were prescribed a medicine to  treat gout, take it as your healthcare provider has instructed. Don't skip doses.    Take anti-inflammatory medicine as directed.     If pain medicines have been prescribed, take them exactly as directed.    Preventing attacks    Minimize or avoid alcohol use. Excess alcohol intake can cause a gout attack.    Limit these foods and beverages:  ? Organ meats, such as kidneys and liver  ? Certain seafoods (anchovies, sardines, shrimp, scallops, herring, mackerel)  ? Wild game, meat extracts and meat gravies  ? Foods and beverages sweetened with high-fructose corn syrup, such as sodas    Eat a healthy diet including low-fat and nonfat dairy, whole grains, and vegetables.    If you are overweight, talk to your healthcare provider about a weight reduction plan. Avoid fasting or extreme low calorie diets (less than 900 calories per day). This will increase uric acid levels in the body.    If you have diabetes or high blood pressure, work with your doctor to manage these conditions.    Protect the joint from injury. Trauma can trigger a gout attack.  Follow-up care  Follow up with your healthcare provider, or as advised.   When to seek medical advice  Call your healthcare provider if you have any of the following:    Fever over 100.4 F (38. C) with worsening joint pain    Increasing redness around the joint    Pain developing in another joint    Repeated vomiting, abdominal pain, or blood in the vomit or stool (black or red color)  Date Last Reviewed: 3/1/2017    3167-2759 [employer]. [address] [place] [address]. All rights reserved. This information is not intended as a substitute for professional medical care. Always follow your healthcare professional's instructions.           Patient Education     Treating Gout Attacks     Raising the joint above the level of your heart can help reduce gout symptoms.     Gout is a disease that affects the joints. It is caused by excess uric acid in your  blood that may lead to crystals forming in your joints. Left untreated, it can lead to painful foot and joint deformities and even kidney problems. But, by treating gout early, you can relieve pain and help prevent future problems. Gout can usually be treated with medicine and proper diet. In severe cases, surgery may be needed.  Gout attacks are painful and often happen more than once. Taking medicines may reduce pain and prevent attacks in the future. There are also some things you can do at home to relieve symptoms.  Medicines for gout  Your healthcare provider may prescribe a daily medicine to reduce levels of uric acid. Reducing your uric acid levels may help prevent gout attacks. Allopurinol is one commonly used medicine taken daily to reduce uric acid levels. Other daily medicines used to reduce uric acid levels include febuxostat, lesinurad, and probencid. Other medicines can help relieve pain and swelling during an acute attack. Medicines such as NSAIDs (nonsteroidal anti-inflammatory medicines), steroids, and colchicine may be prescribed for intermittent use to relieve an acute gout attack. Be sure to take your medicine as directed.  What you can do  Below are some things you can do at home to relieve gout symptoms. Your healthcare provider may have other tips.    Rest the painful joint as much as you can.    Raise the painful joint so it is at a level higher than your heart.    Use ice for 10 minutes every 1 to 2 hours as possible.  How can I prevent gout?  With a little effort, you may be able to prevent gout attacks in the future. Here are some things you can do:    Don't eat foods high in purines  ? Certain meats (red meat, processed meat, turkey)  ? Organ meats (kidney, liver, sweetbread)  ? Shellfish (lobster, crab, shrimp, scallop, mussel)  ? Certain fish (anchovy, sardine, herring, mackerel)    Take any medicines prescribed by your healthcare provider.    Lose weight if you need to.    Reduce high  fructose corn syrup in meals and drinks.    Reduce or cut out alcohol, particularly beer, but also red wine and spirits.    Control blood pressure, diabetes, and cholesterol.    Drink plenty of water to help flush uric acid from your body.  Date Last Reviewed: 4/1/2018 2000-2018 [employer]. [address] [place] [address], [place] [address]. All rights reserved. This information is not intended as a substitute for professional medical care. Always follow your healthcare professional's instructions.           Patient Education     Gout Diet  Gout is a painful condition caused by an excess of uric acid, a waste product made by the body. Uric acid forms crystals that collect in the joints. The immune response to these crystals brings on symptoms of joint pain and swelling. This is called a gout attack. Often, medications and diet changes are combined to manage gout. Below are some guidelines for changing your diet to help you manage gout and prevent attacks. Your healthcare provider will help you determine the best eating plan for you.  Eating to manage gout  Weight loss for those who are overweight may help reduce gout attacks.  Eat less of these foods  Eating too many foods containing purines may raise the levels of uric acid in your body. This raises your risk for a gout attack. Try to limit these foods and drinks:    Alcohol, such as beer and red wine. You may be told to avoid alcohol completely.    Soft drinks that contain sugar or high fructose corn syrup    Certain fish, including anchovies, sardines, fish eggs, and herring    Shellfish    Certain meats, such as red meat, hot dogs, luncheon meats, and turkey    Organ meats, such as liver, kidneys, and sweetbreads    Legumes, such as dried beans and peas    Other high fat foods such as gravy, whole milk, and high fat cheeses    Vegetables such as asparagus, cauliflower, spinach, and mushrooms used to be thought to contribute to an increased risk for a  gout attack, but recent studies show that high purine vegetables don't increase the risk for a gout attack.  Eat more of these foods  Other foods may be helpful for people with gout. Add some of these foods to your diet:    Cherries contain chemicals that may lower uric acid.    Omega fatty acids. These are found in some fatty fish such as salmon, certain oils (flax, olive, or nut), and nuts themselves. Omega fatty acids may help prevent inflammation due to gout.    Dairy products that are low-fat or fat-free, such as cheese and yogurt    Complex carbohydrate foods, including whole grains, brown rice, oats, and beans    Coffee, in moderation    Water, approximately 64 ounces per day  Follow-up care  Follow up with your healthcare provider as advised.  When to seek medical advice  Call your healthcare provider right away if any of these occur:    Return of gout symptoms, usually at night:    Severe pain, swelling, and heat in a joint, especially the base of the big toe    Affected joint is hard to move    Skin of the affected joint is purple or red    Fever of 100.4 F (38 C) or higher    Pain that doesn't get better even with prescribed medicine   Date Last Reviewed: 6/1/2018 2000-2018 [employer]. [address] [place] [address]. All rights reserved. This information is not intended as a substitute for professional medical care. Always follow your healthcare professional's instructions.           Patient Education     Eating to Prevent Gout  Gout is a painful form of arthritis caused by an excess of uric acid. This is a waste product made by the body. It builds up in the body and forms crystals that collect in the joints, causing a gout attack. Alcohol and certain foods can trigger a gout attack. Below are some guidelines for changing your diet to help you manage gout. Your healthcare provider can work with you to determine the best eating plan for you. Know that diet is only one part of  managing gout. Take your medicines as prescribed and follow the other guidelines your healthcare provider has given you.  Foods to limit  Eating too many foods containing purines may increase the levels of uric acid in your body and increase your risk for a gout attack. It may be best to limit these high-purine foods:    Alcohol (beer and red wine). You may be told to avoid alcohol completely.    Certain fish (anchovies, sardines, fish roes, herring, tuna, mussels, codfish, scallops, trout, and maximo)    Certain meats (red meat, processed meat, persaud, turkey, wild game, and goose)    Sauces and gravies made with meat    Organ meats (such as liver, kidneys, sweetbreads, and tripe)    Legumes (such as dried beans and peas)    Mushrooms, spinach, asparagus, and cauliflower    Yeast and yeast extract supplements  Foods to try  Some foods may be helpful for people with gout. You may want to try adding some of the following foods to your diet:    Dark berries. These include blueberries, blackberries, and cherries. These berries contain chemicals that may lower uric acid.    Tofu. Tofu, which is made from soy, is a good source of protein. Studies have shown that it may be a better choice than meat for people with gout.    Omega fatty acids. These acids are found in fatty fish (such as salmon), certain oils (such as flax, olive, or nut oils), or nuts. They may help prevent inflammation due to gout.  The following guidelines are recommended by the American Medical Association for people with gout. Your diet should be:    High in fiber, whole grains, fruits, and vegetables.    Low in protein (15% of calories should come from protein. Choose lean sources, such as soy, lean meats, and poultry).    Low in fat (no more than 30% of calories should come from fat, with only 10% coming from animal, or saturated, fat).   Date Last Reviewed: 11/1/2017 2000-2018 [employer]. [address] [place] [address].  All rights reserved. This information is not intended as a substitute for professional medical care. Always follow your healthcare professional's instructions.           Patient Education     What is Gout?  Gout is a disease that affects the joints. Left untreated, it can lead to painful foot and joint deformities and even kidney problems. But, by treating gout early, you can relieve pain and help prevent future problems. Gout can usually be treated with medicine and proper diet. In severe cases, surgery may be needed.  What causes gout?  Gout is caused by an excess of uric acid (a waste product made by the body). Uric acid is excreted by the kidneys. If the uric acid level in your blood rises too high, the uric acid may form crystals that collect in the joints, bringing on a gout attack. If you have many gout attacks, crystals may form large deposits called tophi. Tophi can damage joints and cause deformity.  Who is at risk for gout?  Men are more likely to have gout than women. But women can also be affected, mostly after menopause. Some health problems, such as obesity and high cholesterol, make gout more likely. And some medicines, such as diuretics ( water pills ), can trigger a gout attack. People who drink a lot of alcohol are at high risk for gout. Certain foods can also trigger a gout attack.  Substances that may trigger a gout attack  To help prevent a gout attack, avoid these foods:    Alcohol (particularly beer, but also red wine and spirits)    Certain meats (red meat, processed meat, turkey)    Organ meats (kidney, liver, sweetbread)    Shellfish (lobster, crab, shrimp, scallop, mussel)    Certain fish (anchovy, sardine, herring, mackerel)   Treatment    Lifestyle changes, including weight loss, exercise, and quitting tobacco use    Reducing consumption of the food groups above as well as high fructose corn syrup, found in many foods including sodas and energy drinks    Changing non-essential medicines  "that may contribute to gout (such as thiazide diuretics--\"water pills\")    Medicines to reduce the amount of uric acid in the blood, such as allopurinol, probencid, febuxostat, and lesinurad.    Medicines to treat acute gout attacks, including NSAIDs (nonsteroidal anti-inflammatory medicines), steroids, and colchicine  Date Last Reviewed: 4/1/2018 2000-2018 [employer]. [address] [place] [address], [place] [address]. All rights reserved. This information is not intended as a substitute for professional medical care. Always follow your healthcare professional's instructions.           "

## 2019-07-16 NOTE — PROGRESS NOTES
"Subjective     Stevie Black is a 44 year old male who presents to clinic today for the following health issues:    HPI   Medication Followup of indomethacin    Taking Medication as prescribed: yes    Side Effects:  None    Medication Helping Symptoms:  Yes    -patient is here to follow up after taking indomethacin     Cut out red meat and shellfish  Cut back alcohol  Doesn't want to take daily medication  No hx ulcers or GI bleed    Patient Active Problem List   Diagnosis     CARDIOVASCULAR SCREENING; LDL GOAL LESS THAN 160     Gout     Infectious mononucleosis     Asymptomatic cholelithiasis     Hypertriglyceridemia     History reviewed. No pertinent surgical history.    Social History     Tobacco Use     Smoking status: Never Smoker     Smokeless tobacco: Never Used   Substance Use Topics     Alcohol use: Yes     Comment: socially     Family History   Problem Relation Age of Onset     Cancer Maternal Grandmother      Cancer Maternal Grandfather          Current Outpatient Medications   Medication Sig Dispense Refill     fluticasone (FLONASE) 50 MCG/ACT nasal spray Spray 1-2 sprays into both nostrils daily 3 Package 1     ibuprofen 200 MG capsule 600 mg (3 tablets) po every 6 hours prn elbow and thumb pain 120 capsule 1     indomethacin (INDOCIN) 50 MG capsule Take 1 capsule (50 mg) by mouth 3 times daily (with meals) 21 capsule 3     No Known Allergies      Reviewed and updated as needed this visit by Provider  Tobacco  Allergies  Meds  Problems  Med Hx  Surg Hx  Fam Hx         Review of Systems   ROS COMP: Constitutional, HEENT, cardiovascular, pulmonary, gi and gu systems are negative, except as otherwise noted.      Objective    /75 (BP Location: Right arm, Patient Position: Chair, Cuff Size: Adult Regular)   Pulse 64   Temp 98.2  F (36.8  C) (Oral)   Resp 16   Ht 1.854 m (6' 1\")   Wt 85.7 kg (189 lb)   SpO2 100%   BMI 24.94 kg/m    Body mass index is 24.94 kg/m .  Physical Exam " "  GENERAL: healthy, alert and no distress  RESP: lungs clear to auscultation - no rales, rhonchi or wheezes  CV: regular rate and rhythm, normal S1 S2, no S3 or S4, no murmur, click or rub, no peripheral edema and peripheral pulses strong  MS: no gross musculoskeletal defects noted, no edema  PSYCH: mentation appears normal, affect normal/bright    Diagnostic Test Results:  Labs reviewed in Epic        Assessment & Plan     1. Gout, unspecified cause, unspecified chronicity, unspecified site  No acute symptoms. Doesn't want daily controller medication. Will use indomethacin for flares. If using more than 2-3 times in 1 year, I asked him to consider allopurinol or colchicine. Continue diet modifications.  - indomethacin (INDOCIN) 50 MG capsule; Take 1 capsule (50 mg) by mouth 3 times daily (with meals)  Dispense: 21 capsule; Refill: 3     BMI:   Estimated body mass index is 24.94 kg/m  as calculated from the following:    Height as of this encounter: 1.854 m (6' 1\").    Weight as of this encounter: 85.7 kg (189 lb).           See Patient Instructions    Return in about 1 year (around 7/16/2020).     The benefits, risks and potential side effects were discussed in detail. Black box warnings discussed as relevant. All patient questions were answered. The patient was instructed to follow up immediately if any adverse reactions develop.    Return precautions discussed, including when to seek urgent/emergent care.    Patient verbalizes understanding and agrees with plan of care. Patient stable for discharge.      RYLIE Pardo [facility]          "

## 2020-07-21 ENCOUNTER — VIRTUAL VISIT (OUTPATIENT)
Dept: FAMILY MEDICINE | Facility: CLINIC | Age: 45
End: 2020-07-21
Payer: COMMERCIAL

## 2020-07-21 DIAGNOSIS — M10.9 GOUT, UNSPECIFIED CAUSE, UNSPECIFIED CHRONICITY, UNSPECIFIED SITE: ICD-10-CM

## 2020-07-21 DIAGNOSIS — E78.1 HYPERTRIGLYCERIDEMIA: Primary | ICD-10-CM

## 2020-07-21 PROCEDURE — 99214 OFFICE O/P EST MOD 30 MIN: CPT | Mod: 95 | Performed by: NURSE PRACTITIONER

## 2020-07-21 RX ORDER — INDOMETHACIN 50 MG/1
50 CAPSULE ORAL
Qty: 21 CAPSULE | Refills: 3 | Status: CANCELLED | OUTPATIENT
Start: 2020-07-21

## 2020-07-21 RX ORDER — INDOMETHACIN 50 MG/1
50 CAPSULE ORAL
Qty: 21 CAPSULE | Refills: 3 | Status: SHIPPED | OUTPATIENT
Start: 2020-07-21 | End: 2021-03-16

## 2020-07-21 ASSESSMENT — PAIN SCALES - GENERAL: PAINLEVEL: MILD PAIN (3)

## 2020-07-21 NOTE — TELEPHONE ENCOUNTER
.Reason for call:  Medication   If this is a refill request, has the caller requested the refill from the pharmacy already? No  Will the patient be using a [facility]? No  Name of the pharmacy and phone number for the current request[facility]    Name of the medication requested: Indocin 50mg gout medication    Other request: pt requesting medication from a year ago. Please call pt if he needs to come and be seen in-clinic    Phone number to reach patient:  Home number on file [phone] (home)    Best Time:  anytime    Can we leave a detailed message on this number?  YES    Travel screening: Negative

## 2020-07-21 NOTE — PROGRESS NOTES
"Stevie Black is a 45 year old male who is being evaluated via a billable telephone visit.      The patient has been notified of following:     \"This telephone visit will be conducted via a call between you and your physician/provider. We have found that certain health care needs can be provided without the need for a physical exam.  This service lets us provide the care you need with a short phone conversation.  If a prescription is necessary we can send it directly to your pharmacy.  If lab work is needed we can place an order for that and you can then stop by our lab to have the test done at a later time.    Telephone visits are billed at different rates depending on your insurance coverage. During this emergency period, for some insurers they may be billed the same as an in-person visit.  Please reach out to your insurance provider with any questions.    If during the course of the call the physician/provider feels a telephone visit is not appropriate, you will not be charged for this service.\"    Patient has given verbal consent for Telephone visit?  Yes    What phone number would you like to be contacted at? [phone]    How would you like to obtain your AVS? Mail a copy    Subjective     Stevie Black is a 45 year old male who presents via phone visit today for the following health issues:    HPI    Gout/ single inflamed joint   Onset: Saturday     Description:   Location: bottom of left foot   Joint Swelling: no   Redness: YES  Pain: YES- when walking     Intensity: 5/10    Progression of Symptoms:  improving    Accompanying Signs & Symptoms:  Fevers: no     History:   Trauma to the area: no   Previous history of gout: YES   Recent illness:  no     Precipitating factors:   Diet-rich in purine: alcohol  Alcohol use: YES  Diuretic use: no     Therapies Tried and outcome: extra RX      Feels like normal gout   No known injury  Drinking alcohol more  Cut out shellfish and red meat which has " helped  He's had a few gout flares in the last year but they have been manageable    Gets annual labs at work every November - wants to hold off on any lab testing because he gets it free through work - he agrees to send it to us this fall        Patient Active Problem List   Diagnosis     CARDIOVASCULAR SCREENING; LDL GOAL LESS THAN 160     Gout     Infectious mononucleosis     Asymptomatic cholelithiasis     Hypertriglyceridemia     History reviewed. No pertinent surgical history.    Social History     Tobacco Use     Smoking status: Never Smoker     Smokeless tobacco: Never Used   Substance Use Topics     Alcohol use: Yes     Comment: socially     Family History   Problem Relation Age of Onset     Cancer Maternal Grandmother      Cancer Maternal Grandfather          Current Outpatient Medications   Medication Sig Dispense Refill     ibuprofen 200 MG capsule 600 mg (3 tablets) po every 6 hours prn elbow and thumb pain 120 capsule 1     indomethacin (INDOCIN) 50 MG capsule Take 1 capsule (50 mg) by mouth 3 times daily (with meals) 21 capsule 3     fluticasone (FLONASE) 50 MCG/ACT nasal spray Spray 1-2 sprays into both nostrils daily (Patient not taking: Reported on 7/21/2020) 3 Package 1     No Known Allergies    Reviewed and updated as needed this visit by Provider         Review of Systems   Constitutional, HEENT, cardiovascular, pulmonary, gi and gu systems are negative, except as otherwise noted.       Objective   Reported vitals:  There were no vitals taken for this visit.   healthy, alert and no distress  PSYCH: Alert and oriented times 3; coherent speech, normal   rate and volume, able to articulate logical thoughts, able   to abstract reason, no tangential thoughts, no hallucinations   or delusions  His affect is normal  RESP: No cough, no audible wheezing, able to talk in full sentences  Remainder of exam unable to be completed due to telephone visits    Diagnostic Test Results:  Labs reviewed in Epic         Assessment/Plan:    1. Gout, unspecified cause, unspecified chronicity, unspecified site  Will refill. I recommended labs but he declines as he gets them through work. He will forward them to us for review. He's working on diet but still drinking alcohol which is likely the cause for this flare. Discussed allopurinol but he doesn't feel like he has enough flares to warrant that.  - indomethacin (INDOCIN) 50 MG capsule; Take 1 capsule (50 mg) by mouth 3 times daily (with meals)  Dispense: 21 capsule; Refill: 3    2. Hypertriglyceridemia  I recommended labs and physical but he gets them through work. He will send them to us this fall.       Return in about 1 week (around 7/28/2020), or if symptoms worsen or fail to improve.      Phone call duration:  6:39 minutes    The benefits, risks and potential side effects were discussed in detail. Black box warnings discussed as relevant. All patient questions were answered. The patient was instructed to follow up immediately if any adverse reactions develop.    Return precautions discussed, including when to seek urgent/emergent care.    Patient verbalizes understanding and agrees with plan of care.       RYLIE Pardo CNP

## 2020-07-21 NOTE — TELEPHONE ENCOUNTER
Routing refill request to provider for review/approval because:  Labs not current:  CBC, Creatinine, ALT, Uric Acid  Patient needs to be seen because it has been more than 1 year since last office visit.      Isabell Ceja RN  [facility]

## 2020-07-21 NOTE — PATIENT INSTRUCTIONS
At [facility], we strive to deliver an exceptional experience to you, every time we see you. If you receive a survey, please complete it as we do value your feedback.  If you have MyChart, you can expect to receive results automatically within 24 hours of their completion.  Your provider will send a note interpreting your results as well.   If you do not have MyChart, you should receive your results in about a week by mail.    Your care team:                            Family Medicine Internal Medicine   MD Zachary May MD Shantel Branch-Fleming, MD Srinivasa Vaka, MD Katya Georgiev PA-C Megan Hill, APRN CNP    Brian Jimenez, MD Pediatrics   Geoff Gibbs, PAJedC  Lizette Bustos, CNP MD Charlotte Etienne APRN CNP   MD Yesi Hawthorne MD Deborah Mielke, MD Laverne Molina, APRN CNP  Darlyn Alcantara, PAJedC  Mary Bonilla, CNP  MD Maday Rodriguez MD Angela Wermerskirchen, MD      Clinic hours: Monday - Thursday 7 am-7 pm; Fridays 7 am-5 pm.   Urgent care: Monday - Friday 11 am-9 pm; Saturday and Sunday 9 am-5 pm.    Clinic: [phone]       [facility]: Monday - Thursday 8 am - 7 pm; Friday 8 am - 6 pm  [facility]: [phone]     Use [email or web address] for 24/7 diagnosis and treatment of dozens of conditions.

## 2021-03-16 ENCOUNTER — OFFICE VISIT (OUTPATIENT)
Dept: FAMILY MEDICINE | Facility: CLINIC | Age: 46
End: 2021-03-16
Payer: COMMERCIAL

## 2021-03-16 ENCOUNTER — TELEPHONE (OUTPATIENT)
Dept: FAMILY MEDICINE | Facility: CLINIC | Age: 46
End: 2021-03-16

## 2021-03-16 ENCOUNTER — HOSPITAL ENCOUNTER (EMERGENCY)
Facility: CLINIC | Age: 46
Discharge: STILL ADMITTED OR ADMINISTRATIVE | End: 2021-03-16
Payer: COMMERCIAL

## 2021-03-16 ENCOUNTER — TRANSFERRED RECORDS (OUTPATIENT)
Dept: HEALTH INFORMATION MANAGEMENT | Facility: CLINIC | Age: 46
End: 2021-03-16

## 2021-03-16 VITALS
BODY MASS INDEX: 23.59 KG/M2 | DIASTOLIC BLOOD PRESSURE: 74 MMHG | HEART RATE: 83 BPM | WEIGHT: 178 LBS | OXYGEN SATURATION: 99 % | SYSTOLIC BLOOD PRESSURE: 118 MMHG | HEIGHT: 73 IN | TEMPERATURE: 97.9 F | RESPIRATION RATE: 14 BRPM

## 2021-03-16 DIAGNOSIS — M25.531 RIGHT WRIST PAIN: ICD-10-CM

## 2021-03-16 DIAGNOSIS — M71.121 SEPTIC BURSITIS OF ELBOW, RIGHT: Primary | ICD-10-CM

## 2021-03-16 PROCEDURE — 99215 OFFICE O/P EST HI 40 MIN: CPT | Performed by: PHYSICIAN ASSISTANT

## 2021-03-16 ASSESSMENT — ENCOUNTER SYMPTOMS
UNEXPECTED WEIGHT CHANGE: 0
DIAPHORESIS: 0
NAUSEA: 0
ARTHRALGIAS: 1
VOMITING: 0
JOINT SWELLING: 1
FEVER: 0
PARESTHESIAS: 0
NUMBNESS: 0
PALPITATIONS: 0

## 2021-03-16 ASSESSMENT — MIFFLIN-ST. JEOR: SCORE: 1741.28

## 2021-03-16 ASSESSMENT — PAIN SCALES - GENERAL: PAINLEVEL: MODERATE PAIN (5)

## 2021-03-16 NOTE — PROGRESS NOTES
Assessment & Plan     Septic bursitis of elbow, right, Right wrist pain  Patient presents to the clinic with pain and redness in the right elbow and new weakness and pain in his right hand. Nontraumatic. His elbow symptoms started on Thursday and Friday of last week, and yesterday, he noticed new wrist stiffness, pain and difficulty with gripping things yesterday-. He tried advil, ice, and rest without relief. He has erythema, pain, and swelling surrounding olecranon; pain is worse with flexion and he is unable to fully extend elbow.  His wrist symptoms include stiffness,  strength 4/5, mild edema, erythema, and pain reproduced with palpation of dorsal midline palpation and radial wrist palpation. He also has markedly limited ROM on right wrist, able to extend wrist about to about 10 degrees.  Denies fever, numbness, tingling. Pulses palpable, 2+. Patient performs repetitive overhead movements in his occupation.  Hx gout and previous septic bursitis in the right elbow.    Differentials: septic bursitis, gout, septic joint    -Exam findings are concerning for septic bursitis of right elbow as well as possible septic joint of right wrist. Recommend evaluation in the ER for further management.    Patient would like to be seen at [facility]. Charge RN has been notified.    Return if symptoms worsen or fail to improve.    Babar Guillory, student APRN  Approved and reviewed.    Miryam Casanova PA-C on 3/16/2021 at 8:45 AM      Miryam Casanova PA-C  [facility] ERIC Hubbard is a 46 year old who presents for the following health issues    HPI Patient started feeling pain and redness in right elbow that moved into the hand. Elbow symptoms started on Thurdsay/Friday of last week; yesterday, he first noticed having difficulty gripping things yesterday. He feels pain and swelling of the right wrist. Advil every 8-12 hours, icing for 1-2 hours during day. He has also tried resting it in the  "middle of the day for 1 hour. Denies trauma. Pain is nonradiating.  Hand weakness worse with gripping. Difficult to hold a bottle and bring to his mouth. Wrist pain with ROM. Pain is sharp. Elbow has mildly limited ROM and erythema/swelling over olecranon.     He states his last flare of gout was 2-3 years ago.  He states he usually gets gout mainly left in knee, ankle, or feet.    Denies fevers, diaphoresis and body aches.    Alcohol: Three 20 oz glasses of mixed drinks when he goes to the bar. About 1/week to 1/ every other week.  Diet: Turkey. Chicken pot pie.  Noodles. Hamburger helper. Peanut butter  Sandwiches.  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      Review of Systems   Constitutional: Negative for diaphoresis, fever and unexpected weight change.   Cardiovascular: Negative for chest pain and palpitations.   Gastrointestinal: Negative for nausea and vomiting.   Musculoskeletal: Positive for arthralgias and joint swelling.        4th digit right, wrist swelling, elbow swelling     Skin: Negative for rash.        Redness on right elbow   Neurological: Negative for numbness and paresthesias.            Objective    /74   Pulse 83   Temp 97.9  F (36.6  C) (Tympanic)   Resp 14   Ht 1.854 m (6' 1\")   Wt 80.7 kg (178 lb)   SpO2 99%   BMI 23.48 kg/m    Body mass index is 23.48 kg/m .  Physical Exam  Constitutional:       Appearance: Normal appearance.   Cardiovascular:      Rate and Rhythm: Normal rate.      Pulses: Normal pulses.      Heart sounds: No murmur. No friction rub. No gallop.       Comments: Radial pulses 2+  Pulmonary:      Effort: Pulmonary effort is normal.      Breath sounds: Normal breath sounds.   Musculoskeletal:      Comments: Right elbow:  Elbow pain with flexion.  Flexion 130, extension 15.  Large area of erythema and swelling surrounding olecranon with tenderness to palpation increased " warmth.    Right wrist:  Pain at dorsal aspect of tenderness to palpation.  Increased warmth, mild erythema. wrist pain with abduction and adduction.  Limited ROM on wrist extension due to pain- about 10 degrees extension/flexion.  strength 4/5 of right hand.     Skin:     Comments: Erythema noted on medial and lateral olecranon; no erythema on wrist.   Neurological:      Mental Status: He is alert.      Comments: Sensation intact to bilateral upper extremities   Psychiatric:         Mood and Affect: Mood normal.         Behavior: Behavior normal.

## 2021-03-16 NOTE — PATIENT INSTRUCTIONS
Your symptoms are concerning for an infection of your elbow that spread to your wrist.  As discussed, important you go to the emergency department-[facility] for further evaluation and treatment.  Please reach out with any questions or concerns.    For any work comp or unemployment paperwork you need to follow-up with your primary care provider.

## 2021-03-18 ENCOUNTER — TELEPHONE (OUTPATIENT)
Dept: FAMILY MEDICINE | Facility: CLINIC | Age: 46
End: 2021-03-18

## 2021-03-18 NOTE — TELEPHONE ENCOUNTER
Patient was referred to emergency department due to concerns for septic right wrist due to septic bursitis of right elbow.  Symptoms were thought to be septic bursitis and gout.  Patient has been compliant with prescribed anti-inflammatories, treatment for gout, and antibiotics.    Patient notes symptoms have significantly improved.  Discussed symptoms that warrant follow-up.    Miryam Casanova PA-C on 3/18/2021 at 9:36 AM

## 2021-09-11 ENCOUNTER — OFFICE VISIT (OUTPATIENT)
Dept: URGENT CARE | Facility: URGENT CARE | Age: 46
End: 2021-09-11
Payer: COMMERCIAL

## 2021-09-11 VITALS
HEART RATE: 58 BPM | TEMPERATURE: 99.3 F | BODY MASS INDEX: 24.54 KG/M2 | SYSTOLIC BLOOD PRESSURE: 128 MMHG | RESPIRATION RATE: 16 BRPM | WEIGHT: 186 LBS | DIASTOLIC BLOOD PRESSURE: 79 MMHG | OXYGEN SATURATION: 98 %

## 2021-09-11 DIAGNOSIS — M10.9 ACUTE GOUTY ARTHRITIS: Primary | ICD-10-CM

## 2021-09-11 PROCEDURE — 99214 OFFICE O/P EST MOD 30 MIN: CPT | Performed by: FAMILY MEDICINE

## 2021-09-11 RX ORDER — INDOMETHACIN 50 MG/1
CAPSULE ORAL
COMMUNITY
Start: 2021-04-01 | End: 2021-09-11

## 2021-09-11 RX ORDER — INDOMETHACIN 50 MG/1
CAPSULE ORAL
Qty: 60 CAPSULE | Refills: 6 | Status: SHIPPED | OUTPATIENT
Start: 2021-09-11

## 2021-09-11 NOTE — PROGRESS NOTES
Assessment & Plan     Acute gouty arthritis  Advised with diet modifications  Elevation, Ice  - indomethacin (INDOCIN) 50 MG capsule; TAKE ONE CAPSULE BY MOUTH THREE TIMES A DAY WITH MEALS       Work on weight loss  Regular exercise    No follow-ups on file.    Tia Hogan MD  [facility] CARE YOVANY Hubbard is a 46 year old who presents for the following health issues HPI   Right knee swelling and tender for the past a few days,  Recurrent gout flare up.  Been taken ibuprofen, and is not helping      Review of Systems   Constitutional, HEENT, cardiovascular, pulmonary, gi and gu systems are negative, except as otherwise noted.      Objective    /79 (BP Location: Left arm, Patient Position: Sitting, Cuff Size: Adult Regular)   Pulse 58   Temp 99.3  F (37.4  C) (Oral)   Resp 16   Wt 84.4 kg (186 lb)   SpO2 98%   BMI 24.54 kg/m    Body mass index is 24.54 kg/m .  Physical Exam   GENERAL: healthy, alert and no distress  MS: Right Knee exam:  Mild swelling, warm and tender to touch.  Full range of motion.  SKIN: no suspicious lesions or rashes    Tia Hogan MD

## 2022-02-23 ENCOUNTER — VIRTUAL VISIT (OUTPATIENT)
Dept: FAMILY MEDICINE | Facility: CLINIC | Age: 47
End: 2022-02-23
Payer: COMMERCIAL

## 2022-02-23 DIAGNOSIS — M10.00 ACUTE IDIOPATHIC GOUT, UNSPECIFIED SITE: Primary | ICD-10-CM

## 2022-02-23 PROCEDURE — 99213 OFFICE O/P EST LOW 20 MIN: CPT | Mod: 95 | Performed by: NURSE PRACTITIONER

## 2022-02-23 RX ORDER — ALLOPURINOL 100 MG/1
100 TABLET ORAL DAILY
Qty: 90 TABLET | Refills: 3 | Status: SHIPPED | OUTPATIENT
Start: 2022-02-23 | End: 2022-04-25

## 2022-02-23 NOTE — PATIENT INSTRUCTIONS
Patient Education     Treating Gout Attacks     Raising the joint above the level of your heart can help reduce gout symptoms.     Gout is a disease that affects the joints. It is caused by excess uric acid in your blood that may lead to crystals forming in your joints. Left untreated, it can lead to painful foot and joint deformities and even kidney problems. But, by treating gout early, you can relieve pain and help prevent future problems. Gout can usually be treated with medicine and proper diet. In severe cases, surgery may be needed.  Gout attacks are painful and often happen more than once. Taking medicines may reduce pain and prevent attacks in the future. There are also some things you can do at home to relieve symptoms.  Medicines for gout  Your healthcare provider may prescribe a daily medicine to reduce levels of uric acid. Reducing your uric acid levels may help prevent gout attacks. Allopurinol is one commonly used medicine taken daily to reduce uric acid levels. Other daily medicines used to reduce uric acid levels include febuxostat, lesinurad, and probencid. Other medicines can help relieve pain and swelling during an acute attack. Medicines such as NSAIDs (nonsteroidal anti-inflammatory medicines), steroids, and colchicine may be prescribed for intermittent use to relieve an acute gout attack. Be sure to take your medicine as directed.  What you can do  Below are some things you can do at home to relieve gout symptoms. Your healthcare provider may have other tips.    Rest the painful joint as much as you can.    Raise the painful joint so it is at a level higher than your heart.    Use ice for 10 minutes every 1 to 2 hours as possible.  How can I prevent gout?  With a little effort, you may be able to prevent gout attacks in the future. Here are some things you can do:    Don't eat foods high in purines  ? Certain meats (red meat, processed meat, turkey)  ? Organ meats (kidney, liver,  sweetbread)  ? Shellfish (lobster, crab, shrimp, scallop, mussel)  ? Certain fish (anchovy, sardine, herring, mackerel)    Take any medicines prescribed by your healthcare provider.    Lose weight if you need to.    Reduce high fructose corn syrup in meals and drinks.    Reduce or cut out alcohol, particularly beer, but also red wine and spirits.    Control blood pressure, diabetes, and cholesterol.    Drink plenty of water to help flush uric acid from your body.  Qubit last reviewed this educational content on 4/1/2018 2000-2021 [employer]. All rights reserved. This information is not intended as a substitute for professional medical care. Always follow your healthcare professional's instructions.           Patient Education     Eating to Prevent Gout  Gout is a painful form of arthritis caused by an excess of uric acid. This is a waste product made by the body. It builds up in the body and forms crystals that collect in the joints, causing a gout attack. Alcohol and certain foods can trigger a gout attack. Below are some guidelines for changing your diet to help you manage gout. Your healthcare provider can work with you to determine the best eating plan for you. You can learn which foods affect your gout more than others. Reactions to different foods can vary from person to person. Know that diet is only one part of managing gout. Take your medicines as prescribed and follow the other guidelines your healthcare provider has given you.   Foods to limit  Eating too many foods containing purines may increase the levels of uric acid in your body and increase your risk for a gout attack. It may be best to limit these high-purine foods:     Alcohol (beer, hard liquor and red wine). You may be told to give up alcohol completely.    Certain fish (anchovies, sardines, fish roes, herring, tuna, mussels, codfish, scallops, trout, and maximo)    Certain meats (red meat, processed meat, persaud, turkey,  wild game, and goose)    Sauces and gravies made with meat    Organ meats (such as liver, kidneys, sweetbreads, and tripe)    Legumes (such as dried beans and peas)    Mushrooms, spinach, asparagus, and cauliflower    Yeast and yeast extract supplements  Foods to try  Some foods may be helpful for people with gout. You may want to try adding some of the following foods to your diet:     Dark berries. These include blueberries, blackberries, and cherries. These berries contain chemicals that may lower uric acid.    Tofu. Tofu, which is made from soy, is a good source of protein. Studies have shown that it may be a better choice than meat for people with gout.    Omega fatty acids. These acids are found in fatty fish (such as salmon), certain oils (such as flax, olive, or nut oils), or nuts. They may help prevent inflammation due to gout.  Gout guidelines  The following guidelines are recommended by the Academy of Nutrition and Dietetics for people with gout. Your diet should be:     High in fiber, whole grains, fruits, and vegetables    Low in protein. About 15% of calories should come from protein. Choose lean sources, such as soy, lean meats, and poultry without the skin.    Low in fat. No more than 30% of calories should come from fat, with only 10% coming from animal (saturated) fat.  Fanny last reviewed this educational content on 8/1/2020 2000-2021 [employer]. All rights reserved. This information is not intended as a substitute for professional medical care. Always follow your healthcare professional's instructions.

## 2022-02-23 NOTE — PROGRESS NOTES
"Stevie is a 47 year old who is being evaluated via a billable video visit.      How would you like to obtain your AVS? MyChart  If the video visit is dropped, the invitation should be resent by: Text to cell phone: [phone]  Will anyone else be joining your video visit? No  {If patient encounters technical issues they should call [phone] :399816}    Video Start Time: {video visit start/end time for provider to select:152948}    {PROVIDER CHARTING PREFERENCE:417100}    Subjective   Stevie is a 47 year old who presents for the following health issues     HPI     {SUPERLIST (Optional):139516}  {additonal problems for provider to add (Optional):386469}    Review of Systems   {ROS COMP (Optional):051889}      Objective           Vitals:  No vitals were obtained today due to virtual visit.    Physical Exam   {video visit exam brief selected:991868::\"GENERAL: Healthy, alert and no distress\",\"EYES: Eyes grossly normal to inspection.  No discharge or erythema, or obvious scleral/conjunctival abnormalities.\",\"RESP: No audible wheeze, cough, or visible cyanosis.  No visible retractions or increased work of breathing.  \",\"SKIN: Visible skin clear. No significant rash, abnormal pigmentation or lesions.\",\"NEURO: Cranial nerves grossly intact.  Mentation and speech appropriate for age.\",\"PSYCH: Mentation appears normal, affect normal/bright, judgement and insight intact, normal speech and appearance well-groomed.\"}    {Diagnostic Test Results (Optional):086079}    {AMBULATORY ATTESTATION (Optional):247863}        Video-Visit Details    Type of service:  Video Visit    Video End Time:{video visit start/end time for provider to select:152948}    Originating Location (pt. Location): {video visit patient location:854805::\"Home\"}    Distant Location (provider location):  [facility]     Platform used for Video Visit: {Virtual Visit Platforms:893678::\"NuuboWell\"}  "

## 2022-02-23 NOTE — PROGRESS NOTES
Stevie is a 47 year old who is being evaluated via a billable telephone visit.      What phone number would you like to be contacted at? [phone]  How would you like to obtain your AVS? Mail a copy    Assessment & Plan     Acute idiopathic gout, unspecified site    - allopurinol (ZYLOPRIM) 100 MG tablet; Take 1 tablet (100 mg) by mouth daily    20 minutes spent on the date of the encounter doing chart review, history and exam, documentation and further activities per the note     See Patient Instructions: follow up as needed. Tips on gout prevention sent through Hers.     Return in about 1 year (around 2/23/2023), or if symptoms worsen or fail to improve.    Rohini Medellin SHIV  [facility] ERIC    Georgina Hubbard is a 47 year old who presents for the following health issues     HPI     Medication Followup of indomethacin    Taking Medication as prescribed: yes    Side Effects:  None    Medication Helping Symptoms:  Yes     Having flare ups more often, toes,, ankles, knees, wrist    Has made diet and lifestyle changes.  He wants to get on a daily preventative medication.  He talked with pharmacist and was told about allopurinol.  Discussed this is the normal preventative medication.      Review of Systems   Constitutional, HEENT, cardiovascular, pulmonary, GI, , musculoskeletal, neuro, skin, endocrine and psych systems are negative, except as otherwise noted.      Objective           Vitals:  No vitals were obtained today due to virtual visit.    Physical Exam   healthy, alert and no distress  PSYCH: Alert and oriented times 3; coherent speech, normal   rate and volume, able to articulate logical thoughts, able   to abstract reason, no tangential thoughts, no hallucinations   or delusions  His affect is normal  RESP: No cough, no audible wheezing, able to talk in full sentences  Remainder of exam unable to be completed due to telephone visits    See orders      Phone call duration: 9  minutes

## 2022-04-24 ENCOUNTER — MYC MEDICAL ADVICE (OUTPATIENT)
Dept: FAMILY MEDICINE | Facility: CLINIC | Age: 47
End: 2022-04-24
Payer: COMMERCIAL

## 2022-04-24 DIAGNOSIS — M10.00 ACUTE IDIOPATHIC GOUT, UNSPECIFIED SITE: ICD-10-CM

## 2022-04-25 RX ORDER — ALLOPURINOL 100 MG/1
200 TABLET ORAL DAILY
Qty: 180 TABLET | Refills: 0 | Status: SHIPPED | OUTPATIENT
Start: 2022-04-25 | End: 2022-11-07

## 2022-04-30 ENCOUNTER — HEALTH MAINTENANCE LETTER (OUTPATIENT)
Age: 47
End: 2022-04-30

## 2022-10-30 ENCOUNTER — OFFICE VISIT (OUTPATIENT)
Dept: URGENT CARE | Facility: URGENT CARE | Age: 47
End: 2022-10-30
Payer: COMMERCIAL

## 2022-10-30 VITALS
OXYGEN SATURATION: 100 % | BODY MASS INDEX: 24.51 KG/M2 | DIASTOLIC BLOOD PRESSURE: 87 MMHG | WEIGHT: 185.8 LBS | SYSTOLIC BLOOD PRESSURE: 144 MMHG | TEMPERATURE: 99 F | HEART RATE: 88 BPM

## 2022-10-30 DIAGNOSIS — B02.9 HERPES ZOSTER WITHOUT COMPLICATION: Primary | ICD-10-CM

## 2022-10-30 DIAGNOSIS — L01.00 IMPETIGO: ICD-10-CM

## 2022-10-30 PROCEDURE — 99214 OFFICE O/P EST MOD 30 MIN: CPT | Performed by: STUDENT IN AN ORGANIZED HEALTH CARE EDUCATION/TRAINING PROGRAM

## 2022-10-30 RX ORDER — VALACYCLOVIR HYDROCHLORIDE 1 G/1
1000 TABLET, FILM COATED ORAL 3 TIMES DAILY
Qty: 21 TABLET | Refills: 0 | Status: SHIPPED | OUTPATIENT
Start: 2022-10-30 | End: 2023-06-13

## 2022-10-30 RX ORDER — CEPHALEXIN 500 MG/1
500 CAPSULE ORAL 3 TIMES DAILY
Qty: 21 CAPSULE | Refills: 0 | Status: SHIPPED | OUTPATIENT
Start: 2022-10-30 | End: 2022-11-06

## 2022-10-30 RX ORDER — MUPIROCIN 20 MG/G
OINTMENT TOPICAL 3 TIMES DAILY
Qty: 15 G | Refills: 0 | Status: SHIPPED | OUTPATIENT
Start: 2022-10-30 | End: 2023-06-13

## 2022-10-30 NOTE — PATIENT INSTRUCTIONS
Shingles: valacyclovir prescription    Impetigo (staph infection): cephalexin antibiotic and mupirocin antibiotic ointment    Berta Soares, CNP

## 2022-10-30 NOTE — PROGRESS NOTES
Assessment & Plan     Herpes zoster without complication  Patient presents with symptoms consistent with shingles outbreak in the ophthalmic dermatome.  He has no current involvement of the eye however I did recommend treating with an antiviral even though he is outside the 72-hour window because he is still getting new lesions.  Advised to monitor for eye pain, redness of the eye or vision changes in the left eye and see an ophthalmologist emergently if these occur.  - valACYclovir (VALTREX) 1000 mg tablet  Dispense: 21 tablet; Refill: 0    Impetigo  Patient has superimposed  staph infection likely from scratching the sores from shingles.  There is evidence of infection into the tissue so I am going to treat with an oral antibiotic and topical antibiotic to treat more aggressively particularly because it is near the eye.  Advised to monitor redness, swelling and if it is worsening or not improving to return to urgent care or be seen in the ER.  - cephALEXin (KEFLEX) 500 MG capsule  Dispense: 21 capsule; Refill: 0  - mupirocin (BACTROBAN) 2 % external ointment  Dispense: 15 g; Refill: 0       No follow-ups on file.    RYLIE Cannon [facility] URGENT CARE [facility]    Georgina Hubbard is a 47 year old male who presents to clinic today for the following health issues:  Chief Complaint   Patient presents with     Urgent Care     Eye Swelling     Got swollen eyes, wife thinks impetigo      HPI  Patient reports that he had redness above the left eyebrow on Tuesday and on Wednesday he developed a bump and headache on the left side.  He has been having headaches on the left side intermittently over the past 3 days and now has lesions to the left of his eye and on his scalp.  He has been working extra hours recently and not sleeping as well.  He denies pain in his eye, vision changes, fevers or chills.      Review of Systems  Constitutional, HEENT, cardiovascular, pulmonary, gi and gu  systems are negative, except as otherwise noted.      Objective    BP (!) 144/87 (BP Location: Left arm, Patient Position: Sitting, Cuff Size: Adult Regular)   Pulse 88   Temp 99  F (37.2  C) (Tympanic)   Wt 84.3 kg (185 lb 12.8 oz)   SpO2 100%   BMI 24.51 kg/m    Physical Exam   GENERAL: healthy, alert and no distress  EYES: Eyes grossly normal to inspection  MS: no gross musculoskeletal defects noted, no edema  SKIN: Papulovesicular lesion on left anterior scalp not crossing the midline, crusted lesion above left eyebrow and lateral to left eye with honey colored crusting and surrounding erythema and localized swelling  NEURO: Normal strength and tone, mentation intact and speech normal  PSYCH: mentation appears normal, affect normal/bright

## 2022-11-20 ENCOUNTER — HEALTH MAINTENANCE LETTER (OUTPATIENT)
Age: 47
End: 2022-11-20

## 2023-02-14 DIAGNOSIS — M10.00 ACUTE IDIOPATHIC GOUT, UNSPECIFIED SITE: ICD-10-CM

## 2023-02-14 RX ORDER — ALLOPURINOL 100 MG/1
200 TABLET ORAL DAILY
Qty: 180 TABLET | Refills: 1 | OUTPATIENT
Start: 2023-02-14

## 2023-05-24 DIAGNOSIS — M10.00 ACUTE IDIOPATHIC GOUT, UNSPECIFIED SITE: ICD-10-CM

## 2023-05-24 RX ORDER — ALLOPURINOL 100 MG/1
200 TABLET ORAL DAILY
Qty: 42 TABLET | Refills: 0 | Status: SHIPPED | OUTPATIENT
Start: 2023-05-24 | End: 2023-06-13

## 2023-05-24 RX ORDER — ALLOPURINOL 100 MG/1
200 TABLET ORAL DAILY
Qty: 40 TABLET | Refills: 0 | OUTPATIENT
Start: 2023-05-24

## 2023-05-24 NOTE — TELEPHONE ENCOUNTER
I have not seen patient in a couple of years  Needs visit  Last visit with any provider >1 year ago

## 2023-06-01 ENCOUNTER — HEALTH MAINTENANCE LETTER (OUTPATIENT)
Age: 48
End: 2023-06-01

## 2023-06-13 ENCOUNTER — OFFICE VISIT (OUTPATIENT)
Dept: FAMILY MEDICINE | Facility: CLINIC | Age: 48
End: 2023-06-13
Payer: COMMERCIAL

## 2023-06-13 VITALS
DIASTOLIC BLOOD PRESSURE: 77 MMHG | HEIGHT: 72 IN | BODY MASS INDEX: 25.68 KG/M2 | SYSTOLIC BLOOD PRESSURE: 132 MMHG | TEMPERATURE: 97.1 F | RESPIRATION RATE: 18 BRPM | OXYGEN SATURATION: 99 % | WEIGHT: 189.6 LBS | HEART RATE: 69 BPM

## 2023-06-13 DIAGNOSIS — R06.83 SNORING: Primary | ICD-10-CM

## 2023-06-13 DIAGNOSIS — Z12.11 SCREEN FOR COLON CANCER: ICD-10-CM

## 2023-06-13 DIAGNOSIS — M10.00 ACUTE IDIOPATHIC GOUT, UNSPECIFIED SITE: ICD-10-CM

## 2023-06-13 PROCEDURE — 99213 OFFICE O/P EST LOW 20 MIN: CPT | Performed by: NURSE PRACTITIONER

## 2023-06-13 PROCEDURE — 36415 COLL VENOUS BLD VENIPUNCTURE: CPT | Performed by: NURSE PRACTITIONER

## 2023-06-13 PROCEDURE — 80048 BASIC METABOLIC PNL TOTAL CA: CPT | Performed by: NURSE PRACTITIONER

## 2023-06-13 PROCEDURE — 84550 ASSAY OF BLOOD/URIC ACID: CPT | Performed by: NURSE PRACTITIONER

## 2023-06-13 RX ORDER — ALLOPURINOL 100 MG/1
100 TABLET ORAL DAILY
Qty: 90 TABLET | Refills: 3 | Status: SHIPPED | OUTPATIENT
Start: 2023-06-13 | End: 2024-06-24

## 2023-06-13 ASSESSMENT — PAIN SCALES - GENERAL: PAINLEVEL: NO PAIN (0)

## 2023-06-13 NOTE — PROGRESS NOTES
Assessment & Plan     Snoring  Refer to sleep clinic.   - Adult Sleep Eval & Management  Referral; Future    Acute idiopathic gout, unspecified site  Refilled. Due for labs today.  - allopurinol (ZYLOPRIM) 100 MG tablet; Take 1 tablet (100 mg) by mouth daily  - Uric acid; Future  - Basic metabolic panel  (Ca, Cl, CO2, Creat, Gluc, K, Na, BUN); Future  - Uric acid  - Basic metabolic panel  (Ca, Cl, CO2, Creat, Gluc, K, Na, BUN)    Screen for colon cancer  - Colonoscopy Screening  Referral; Future     BMI:   Estimated body mass index is 25.71 kg/m  as calculated from the following:    Height as of this encounter: 1.829 m (6').    Weight as of this encounter: 86 kg (189 lb 9.6 oz).       The benefits, risks and potential side effects were discussed in detail. Black box warnings discussed as relevant. All patient questions were answered. The patient was instructed to follow up immediately if any adverse reactions develop.    Return precautions discussed, including when to seek urgent/emergent care.    Patient verbalizes understanding and agrees with plan of care. Patient stable for discharge.      RYLIE DODD CNP [facility]    Georgina Hubbard is a 48 year old, presenting for the following health issues:  Sleep Problem        6/13/2023     3:05 PM   Additional Questions   Roomed by kailey   Accompanied by wife, Zee         6/13/2023     3:05 PM   Patient Reported Additional Medications   Patient reports taking the following new medications none     History of Present Illness       Reason for visit:  Sleep  Symptom onset:  More than a month  Symptoms include:  Tired snoring  Symptom intensity:  Moderate  Symptom progression:  Staying the same    He eats 2-3 servings of fruits and vegetables daily.He consumes 1 sweetened beverage(s) daily.He exercises with enough effort to increase his heart rate 9 or less minutes per day.  He exercises with enough effort to  increase his heart rate 3 or less days per week.   He is taking medications regularly.       Here with spouse  Snoring every night. Often feels unrested. Wife reports he does have apneic spells and she wakes him up    Needs refill of allopurinol. Takes once daily. Last flare few months ago      Review of Systems   Constitutional, HEENT, cardiovascular, pulmonary, GI, , musculoskeletal, neuro, skin, endocrine and psych systems are negative, except as otherwise noted.      Objective    /77 (BP Location: Left arm, Patient Position: Sitting, Cuff Size: Adult Regular)   Pulse 69   Temp 97.1  F (36.2  C) (Tympanic)   Resp 18   Ht 1.829 m (6')   Wt 86 kg (189 lb 9.6 oz)   SpO2 99%   BMI 25.71 kg/m    Body mass index is 25.71 kg/m .  Physical Exam   GENERAL: healthy, alert and no distress  RESP: lungs clear to auscultation - no rales, rhonchi or wheezes  CV: regular rate and rhythm, normal S1 S2, no S3 or S4, no murmur, click or rub, no peripheral edema and peripheral pulses strong  MS: no gross musculoskeletal defects noted, no edema  PSYCH: mentation appears normal, affect normal/bright    No results found for any visits on 06/13/23.

## 2023-06-13 NOTE — PATIENT INSTRUCTIONS
At [facility], we strive to deliver an exceptional experience to you, every time we see you. If you receive a survey, please complete it as we do value your feedback.  If you have MyChart, you can expect to receive results automatically within 24 hours of their completion.  Your provider will send a note interpreting your results as well.   If you do not have MyChart, you should receive your results in about a week by mail.    Your care team:                            Family Medicine Internal Medicine   MD Zachary May MD Shantel Branch-Fleming, MD Srinivasa Vaka, MD Katya Belousova, PARYLIE Weldon CNP, MD ([facility]   Geoff Gibbs, MD Radha Ray MD Amelia Massimini APRN KATELYN Molina APRN MD Suyapa Lucas MD          Clinic hours: Monday - Thursday 7 am-6 pm; Fridays 7 am-5 pm.   Urgent care: Monday - Friday 10 am- 8 pm; Saturday and Sunday 9 am-5 pm.    Clinic: [phone]       [facility]: Monday - Thursday 8 am - 7 pm; Friday 8 am - 6 pm  [facility]: [phone]

## 2023-06-14 LAB
ANION GAP SERPL CALCULATED.3IONS-SCNC: 13 MMOL/L (ref 7–15)
BUN SERPL-MCNC: 27.5 MG/DL (ref 6–20)
CALCIUM SERPL-MCNC: 10 MG/DL (ref 8.6–10)
CHLORIDE SERPL-SCNC: 105 MMOL/L (ref 98–107)
CREAT SERPL-MCNC: 1.15 MG/DL (ref 0.67–1.17)
DEPRECATED HCO3 PLAS-SCNC: 21 MMOL/L (ref 22–29)
GFR SERPL CREATININE-BSD FRML MDRD: 79 ML/MIN/1.73M2
GLUCOSE SERPL-MCNC: 81 MG/DL (ref 70–99)
POTASSIUM SERPL-SCNC: 5.1 MMOL/L (ref 3.4–5.3)
SODIUM SERPL-SCNC: 139 MMOL/L (ref 136–145)
URATE SERPL-MCNC: 7.2 MG/DL (ref 3.4–7)

## 2023-06-23 ENCOUNTER — TELEPHONE (OUTPATIENT)
Dept: GASTROENTEROLOGY | Facility: CLINIC | Age: 48
End: 2023-06-23
Payer: COMMERCIAL

## 2023-06-23 NOTE — TELEPHONE ENCOUNTER
"Endoscopy Scheduling Screen    Have you had a positive Covid test in the last 14 days?  No    Are you active on MyChart?   Yes    What insurance is in the chart?  BC/BS: Schedule in [facility] unless patient meets exclusion criteria.     Ordering/Referring Provider: ADITHYA SINGLETON   (If ordering provider performs procedure, schedule with ordering provider unless otherwise instructed. )    BMI: Estimated body mass index is 25.71 kg/m  as calculated from the following:    Height as of 6/13/23: 1.829 m (6').    Weight as of 6/13/23: 86 kg (189 lb 9.6 oz).     Sedation Ordered  moderate sedation.   If patient BMI > 50 do not schedule in ASC.    Are you taking any prescription medications for pain?   No    Are you taking methadone or Suboxone?  No    Do you have a history of malignant hyperthermia or adverse reaction to anesthesia?  No    (Females) Are you currently pregnant?        Have you been diagnosed or told you have pulmonary hypertension?   No    Do you have an LVAD?  No    Have you been told you have moderate to severe sleep apnea?  No    Have you been told you have COPD, asthma, or any other lung disease?  No    Do you have any heart conditions?  No     Have you ever had or are you awaiting a heart or lung transplant?   No    Have you had a stroke or transient ischemic attack (TIA aka \"mini stroke\" in the last 6 months?   No    Have you been diagnosed with or been told you have cirrhosis of the liver?   No    Are you currently on dialysis?   No    Do you need assistance transferring?   No    BMI: Estimated body mass index is 25.71 kg/m  as calculated from the following:    Height as of 6/13/23: 1.829 m (6').    Weight as of 6/13/23: 86 kg (189 lb 9.6 oz).     Is patients BMI > 40 and scheduling location [facility]?  No    Do you take the medication Phentermine, Ozempic or Wegovy?  No    Do you take the medication Naltrexone?  No    Do you take blood thinners?  No      Prep   Are you currently on dialysis or do you have " chronic kidney disease?  No    Do you have a diagnosis of diabetes?  No    Do you have a diagnosis of cystic fibrosis (CF)?  No    On a regular basis do you go 3 -5 days between bowel movements?  No    BMI > 40?  No    Preferred Pharmacy:      [facility]/pharmacy #[id number] - [place], [place] - [address]  [address] [address]  Phone: [phone] Fax: [phone]      Final Scheduling Details   Colonoscopy prep sent?  MiraLAX (No Mag Citrate)    Procedure scheduled  Colonoscopy    Surgeon:  RAMESH     Date of procedure:  9/1     Schedule PAC:   No    Location  [facility]    Sedation   Moderate Sedation    Patient Reminders:    You will receive a call from a Nurse to review instructions and health history.  This assessment must be completed prior to your procedure.  Failure to complete the Nurse assessment may result in the procedure being cancelled.       On the day of your procedure, please designate an adult(s) who can drive you home stay with you for the next 24 hours. The medicines used in the exam will make you sleepy. You will not be able to drive.       You cannot take public transportation, ride share services, or non-medical taxi service without a responsible caregiver.  Medical transport services are allowed with the requirement that a responsible caregiver will receive you at your destination.  We require that drivers and caregivers are confirmed prior to your procedure.

## 2023-08-17 ENCOUNTER — TELEPHONE (OUTPATIENT)
Dept: GASTROENTEROLOGY | Facility: CLINIC | Age: 48
End: 2023-08-17
Payer: COMMERCIAL

## 2023-08-17 NOTE — TELEPHONE ENCOUNTER
Attempted to contact patient in order to complete pre assessment questions.     No answer. Left message to return call to [phone] option 4      Procedure details:    Patient scheduled for Colonoscopy  on 9/1/23.     Arrival time: 0615. Procedure time 0700    Pre op exam needed? N/A    Facility location: [facility]; [address], [place] [address]    Sedation type: Conscious sedation     Indication for procedure: screening      Chart review:     Electronic implanted devices? No    Diabetic? No    Diabetic medication HOLDING recommendations: (if applicable)  Oral diabetic medications: No  Diabetic injectables: No  Insulin: No      Medication review:    Anticoagulants? No    NSAIDS? No    Other medication HOLDING recommendations:  N/A      Prep for procedure:     Bowel prep recommendation: Miralax prep without magnesium citrate   Due to: standard bowel prep.    Prep instructions sent via ZMP.     Melina Khan RN  Endoscopy Procedure Pre Assessment RN

## 2023-08-21 NOTE — TELEPHONE ENCOUNTER
Pre assessment completed for upcoming procedure.   (Please see previous telephone encounter notes for complete details)    Patient  returned call.       Procedure details:    Arrival time and facility location reviewed    Pre op exam needed? N/A    Designated  policy reviewed. Instructed to have someone stay 6 hours post procedure.     COVID policy reviewed.      Medication review:    Medications reviewed. Please see supporting documentation below. Holding recommendations discussed (if applicable).       Prep for procedure:     Reviewed procedure prep instructions.       Additional information needed?  Iron and fiber supplements.  HOLD x 7 days before the procedure.  Ibuprofen PRN.  Hold day prior to procedure.       Patient  verbalized understanding and had no questions or concerns at this time.      Bhakti Branham RN  Endoscopy Procedure Pre Assessment RN  [phone] option 4

## 2023-09-01 ENCOUNTER — HOSPITAL ENCOUNTER (OUTPATIENT)
Facility: AMBULATORY SURGERY CENTER | Age: 48
Discharge: HOME | End: 2023-09-01
Attending: INTERNAL MEDICINE | Admitting: INTERNAL MEDICINE
Payer: COMMERCIAL

## 2023-09-01 VITALS
OXYGEN SATURATION: 98 % | TEMPERATURE: 97.4 F | HEART RATE: 77 BPM | DIASTOLIC BLOOD PRESSURE: 84 MMHG | RESPIRATION RATE: 16 BRPM | SYSTOLIC BLOOD PRESSURE: 126 MMHG

## 2023-09-01 LAB — COLONOSCOPY: NORMAL

## 2023-09-01 PROCEDURE — 45378 DIAGNOSTIC COLONOSCOPY: CPT

## 2023-09-01 PROCEDURE — G8907 PT DOC NO EVENTS ON DISCHARG: HCPCS

## 2023-09-01 PROCEDURE — G8918 PT W/O PREOP ORDER IV AB PRO: HCPCS

## 2023-09-01 RX ORDER — DIPHENHYDRAMINE HYDROCHLORIDE 50 MG/ML
INJECTION INTRAMUSCULAR; INTRAVENOUS PRN
Status: DISCONTINUED | OUTPATIENT
Start: 2023-09-01 | End: 2023-09-01 | Stop reason: HOSPADM

## 2023-09-01 RX ORDER — NALOXONE HYDROCHLORIDE 0.4 MG/ML
0.2 INJECTION, SOLUTION INTRAMUSCULAR; INTRAVENOUS; SUBCUTANEOUS
Status: DISCONTINUED | OUTPATIENT
Start: 2023-09-01 | End: 2023-09-02 | Stop reason: HOSPADM

## 2023-09-01 RX ORDER — NALOXONE HYDROCHLORIDE 0.4 MG/ML
0.4 INJECTION, SOLUTION INTRAMUSCULAR; INTRAVENOUS; SUBCUTANEOUS
Status: DISCONTINUED | OUTPATIENT
Start: 2023-09-01 | End: 2023-09-02 | Stop reason: HOSPADM

## 2023-09-01 RX ORDER — FLUMAZENIL 0.1 MG/ML
0.2 INJECTION, SOLUTION INTRAVENOUS
Status: ACTIVE | OUTPATIENT
Start: 2023-09-01 | End: 2023-09-01

## 2023-09-01 RX ORDER — ONDANSETRON 2 MG/ML
4 INJECTION INTRAMUSCULAR; INTRAVENOUS
Status: DISCONTINUED | OUTPATIENT
Start: 2023-09-01 | End: 2023-09-02 | Stop reason: HOSPADM

## 2023-09-01 RX ORDER — PROCHLORPERAZINE MALEATE 10 MG
10 TABLET ORAL EVERY 6 HOURS PRN
Status: DISCONTINUED | OUTPATIENT
Start: 2023-09-01 | End: 2023-09-02 | Stop reason: HOSPADM

## 2023-09-01 RX ORDER — LIDOCAINE 40 MG/G
CREAM TOPICAL
Status: DISCONTINUED | OUTPATIENT
Start: 2023-09-01 | End: 2023-09-02 | Stop reason: HOSPADM

## 2023-09-01 RX ORDER — ONDANSETRON 4 MG/1
4 TABLET, ORALLY DISINTEGRATING ORAL EVERY 6 HOURS PRN
Status: DISCONTINUED | OUTPATIENT
Start: 2023-09-01 | End: 2023-09-02 | Stop reason: HOSPADM

## 2023-09-01 RX ORDER — ONDANSETRON 2 MG/ML
4 INJECTION INTRAMUSCULAR; INTRAVENOUS EVERY 6 HOURS PRN
Status: DISCONTINUED | OUTPATIENT
Start: 2023-09-01 | End: 2023-09-02 | Stop reason: HOSPADM

## 2023-09-01 RX ORDER — FENTANYL CITRATE 50 UG/ML
INJECTION, SOLUTION INTRAMUSCULAR; INTRAVENOUS PRN
Status: DISCONTINUED | OUTPATIENT
Start: 2023-09-01 | End: 2023-09-01 | Stop reason: HOSPADM

## 2023-09-01 NOTE — H&P
[facility] Anesthesia Pre-op History and Physical    Stevie Black MRN# [id number]   Age: 48 year old YOB: 1975            Date of Exam 9/1/2023         Primary care provider: Ngozi Cox         Chief Complaint and/or Reason for Procedure:     CRC screening       Active problem list:     Patient Active Problem List    Diagnosis Date Noted    Hypertriglyceridemia 10/12/2017     Priority: Medium    Asymptomatic cholelithiasis 12/05/2014     Priority: Medium     Noted on US 12/2014, non obstructing      Infectious mononucleosis 11/14/2014     Priority: Medium     With hepatosplenomegaly on US 11/14/2014.  Improved on US 12/1/2014.      Gout 07/31/2012     Priority: Medium     Problem list name updated by automated process. Provider to review      CARDIOVASCULAR SCREENING; LDL GOAL LESS THAN 160 10/31/2010     Priority: Medium            Medications (include herbals and vitamins):   Any Plavix use in the last 7 days? No     Current Outpatient Medications   Medication Sig    allopurinol (ZYLOPRIM) 100 MG tablet Take 1 tablet (100 mg) by mouth daily    indomethacin (INDOCIN) 50 MG capsule TAKE ONE CAPSULE BY MOUTH THREE TIMES A DAY WITH MEALS     Current Facility-Administered Medications   Medication    lidocaine (LMX4) kit    lidocaine 1 % 0.1-1 mL    ondansetron (ZOFRAN) injection 4 mg    sodium chloride (PF) 0.9% PF flush 3 mL    sodium chloride (PF) 0.9% PF flush 3 mL             Allergies:    No Known Allergies  Allergy to Latex? No  Allergy to tape?   No  Intolerances:             Physical Exam:   All vitals have been reviewed  Patient Vitals for the past 8 hrs:   BP Temp Temp src Pulse Resp SpO2   09/01/23 0647 (!) 142/89 -- -- 71 -- --   09/01/23 0645 -- 97.4  F (36.3  C) Temporal -- 14 100 %     No intake/output data recorded.  Lungs:   No increased work of breathing, good air exchange, clear to auscultation bilaterally, no crackles or wheezing     Cardiovascular:   normal S1  and S2             Lab / Radiology Results:            Anesthetic risk and/or ASA classification:     1  Aracely Krishnamurthy DO

## 2023-11-15 PROBLEM — E78.1 HYPERTRIGLYCERIDEMIA: Chronic | Status: ACTIVE | Noted: 2017-10-12

## 2023-11-16 ENCOUNTER — OFFICE VISIT (OUTPATIENT)
Dept: SLEEP MEDICINE | Facility: CLINIC | Age: 48
End: 2023-11-16
Attending: NURSE PRACTITIONER
Payer: COMMERCIAL

## 2023-11-16 VITALS
HEIGHT: 72 IN | SYSTOLIC BLOOD PRESSURE: 126 MMHG | OXYGEN SATURATION: 97 % | HEART RATE: 64 BPM | BODY MASS INDEX: 26.14 KG/M2 | WEIGHT: 193 LBS | DIASTOLIC BLOOD PRESSURE: 74 MMHG

## 2023-11-16 DIAGNOSIS — R53.81 MALAISE AND FATIGUE: ICD-10-CM

## 2023-11-16 DIAGNOSIS — R06.83 SNORING: ICD-10-CM

## 2023-11-16 DIAGNOSIS — R53.83 MALAISE AND FATIGUE: ICD-10-CM

## 2023-11-16 DIAGNOSIS — R06.00 DYSPNEA AND RESPIRATORY ABNORMALITY: Primary | ICD-10-CM

## 2023-11-16 DIAGNOSIS — Z72.820 LACK OF ADEQUATE SLEEP: ICD-10-CM

## 2023-11-16 DIAGNOSIS — R06.89 DYSPNEA AND RESPIRATORY ABNORMALITY: Primary | ICD-10-CM

## 2023-11-16 DIAGNOSIS — G47.30 SLEEP APNEA, UNSPECIFIED TYPE: ICD-10-CM

## 2023-11-16 PROCEDURE — 99244 OFF/OP CNSLTJ NEW/EST MOD 40: CPT | Performed by: PHYSICIAN ASSISTANT

## 2023-11-16 ASSESSMENT — SLEEP AND FATIGUE QUESTIONNAIRES
HOW LIKELY ARE YOU TO NOD OFF OR FALL ASLEEP WHILE LYING DOWN TO REST IN THE AFTERNOON WHEN CIRCUMSTANCES PERMIT: HIGH CHANCE OF DOZING
HOW LIKELY ARE YOU TO NOD OFF OR FALL ASLEEP IN A CAR, WHILE STOPPED FOR A FEW MINUTES IN TRAFFIC: WOULD NEVER DOZE
HOW LIKELY ARE YOU TO NOD OFF OR FALL ASLEEP WHILE SITTING QUIETLY AFTER LUNCH WITHOUT ALCOHOL: SLIGHT CHANCE OF DOZING
HOW LIKELY ARE YOU TO NOD OFF OR FALL ASLEEP WHILE WATCHING TV: HIGH CHANCE OF DOZING
HOW LIKELY ARE YOU TO NOD OFF OR FALL ASLEEP WHILE SITTING AND TALKING TO SOMEONE: WOULD NEVER DOZE
HOW LIKELY ARE YOU TO NOD OFF OR FALL ASLEEP WHILE SITTING INACTIVE IN A PUBLIC PLACE: SLIGHT CHANCE OF DOZING
HOW LIKELY ARE YOU TO NOD OFF OR FALL ASLEEP WHEN YOU ARE A PASSENGER IN A CAR FOR AN HOUR WITHOUT A BREAK: SLIGHT CHANCE OF DOZING
HOW LIKELY ARE YOU TO NOD OFF OR FALL ASLEEP WHILE SITTING AND READING: HIGH CHANCE OF DOZING

## 2023-11-16 NOTE — PROGRESS NOTES
Outpatient Sleep Medicine Consultation:      Name: Stevie Black MRN# [id number]   Age: 48 year old YOB: 1975     Date of Consultation: November 16, 2023  Consultation is requested by: RYLIE Sung CNP  No address on file Ngozi Cox  Primary care provider: Ngozi Cox       Reason for Sleep Consult:     Stevie Black is sent by Ngozi Cox for a sleep consultation regarding sleep apnea.    Patient s Reason for visit  Stevie Black main reason for visit: Not feeling rested. Always tired. Stops breathing and gasps for air during sleep.  Patient states problem(s) started: As long as i can remember  Stevie Black's goals for this visit: To get restful sleep and feel rested. Not tired and falling asleep all the time.           Assessment and Plan:     Summary Sleep Diagnoses & Recommendations:   Patient has features and risk factors for possible obstructive sleep apnea including: loud snoring, witnessed apnea, non-refreshing sleep, daytime sleepiness (ESS 12) and crowded oropharynx. The STOP-BANG score is 4/8. The pathophysiology, diagnosis and treatment of AJ was discussed and a handout was provided.   Plan:    1. Schedule a Home Sleep Apnea Testing to evaluate for obstructive sleep apnea.    2. He is interested in a mandibular advancement device for mild sleep apnea and CPAP for moderate to severe sleep apnea.  If HST, is inconclusive or negative, the patient will proceed with an attended polysomnogram   3. Recommend weight management.      Summary Recommendations:  Orders Placed This Encounter   Procedures    HST-Home Sleep Apnea Test - Noxturnal Returnable       Summary Counseling:    Sleep Testing Reviewed  Obstructive Sleep Apnea Reviewed  Complications of Untreated Sleep Apnea Reviewed    Medical Decision-making:   Educational materials provided in instructions    Total time spent reviewing medical records, history and physical examination,  review of previous testing and interpretation as well as documentation on this date:48 minutes    CC: Ngozi Cox          History of Present Illness:     Past Sleep Evaluations:NA    SLEEP-WAKE SCHEDULE:     Work/School Days: Patient goes to school/work: Yes   Usually gets into bed at 9pm  Takes patient about 1 or 2 minutes. to fall asleep  Has trouble falling asleep 0 nights per week  Wakes up in the middle of the night 0 to 1. Caused by wife coming in or to use bathroom. times.  Wakes up due to External stimuli (bed partner, pets, noise, etc);Use the bathroom  He has trouble falling back asleep 0 times a week.   It usually takes Less than a minute, usually. to get back to sleep  Patient is usually up at 5am  Uses alarm: Yes    Weekends/Non-work Days/All Other Days:  Usually gets into bed at 11pm   Takes patient about 1 to 2 minutes to fall asleep  Patient is usually up at 7:30-8am  Uses alarm: No    Sleep Need  Patient gets  8 hrs sleep on average. He does not feel rested   Patient thinks he needs about 8 hrs sleep    Stevie P Wayne prefers to sleep in this position(s): Back;Side   Patient states they do the following activities in bed:      Naps  Patient takes a purposeful nap 0 times a week and naps are usually N/A in duration  He feels better after a nap:    He dozes off unintentionally 7 days per week  Patient has had a driving accident or near-miss due to sleepiness/drowsiness: No      SLEEP DISRUPTIONS:    Breathing/Snoring  Patient snores:Yes  Other people complain about his snoring: Yes  Patient has been told he stops breathing in his sleep:Yes  He has issues with the following: Stuffy nose when you wake up    Movement:  Patient gets pain, discomfort, with an urge to move:  No  It happens when he is resting:  No  It happens more at night:  No  Patient has been told he kicks his legs at night:  No     Behaviors in Sleep:  Stevie P Wayne has experienced the following behaviors while sleeping:  Recurring Nightmares  He has experienced sudden muscle weakness during the day: No      Is there anything else you would like your sleep provider to know:        CAFFEINE AND OTHER SUBSTANCES:    Patient consumes caffeinated beverages per day:  1  Last caffeine use is usually: 6am  List of any prescribed or over the counter stimulants that patient takes: N/A  List of any prescribed or over the counter sleep medication patient takes: N/A  List of previous sleep medications that patient has tried: N/A  Patient drinks alcohol to help them sleep: No  Patient drinks alcohol near bedtime: No    Family History:  Patient has a family member been diagnosed with a sleep disorder: Yes  Sister, Uncle margoh Sleep Apnea         SCALES:    EPWORTH SLEEPINESS SCALE         11/16/2023     2:41 PM    [place] Sleepiness Scale ( SHUKRI Mae  [id number]<br>ESS - [place]/English - Final version - 21 Nov 07 - [facility] [place].)   Sitting and reading High chance of dozing   Watching TV High chance of dozing   Sitting, inactive in a public place (e.g. a theatre or a meeting) Slight chance of dozing   As a passenger in a car for an hour without a break Slight chance of dozing   Lying down to rest in the afternoon when circumstances permit High chance of dozing   Sitting and talking to someone Would never doze   Sitting quietly after a lunch without alcohol Slight chance of dozing   In a car, while stopped for a few minutes in traffic Would never doze   [place] Score (MC) 12   [place] Score (Sleep) 12         INSOMNIA SEVERITY INDEX (JAMEL)          11/16/2023     2:22 PM   Insomnia Severity Index (JAMEL)   Difficulty falling asleep 0   Difficulty staying asleep 1   Problems waking up too early 0   How SATISFIED/DISSATISFIED are you with your CURRENT sleep pattern? 2   How NOTICEABLE to others do you think your sleep problem is in terms of impairing the quality of your life? 2   How WORRIED/DISTRESSED are you about your current sleep problem? 1  "  To what extent do you consider your sleep problem to INTERFERE with your daily functioning (e.g. daytime fatigue, mood, ability to function at work/daily chores, concentration, memory, mood, etc.) CURRENTLY? 2   JAMEL Total Score 8       Guidelines for Scoring/Interpretation:  Total score categories:  0-7 = No clinically significant insomnia   8-14 = Subthreshold insomnia   15-21 = Clinical insomnia (moderate severity)  22-28 = Clinical insomnia (severe)  Used via courtesy of www.LookMedBookealth.va.gov with permission from Theodore Chapman PhD., [facility]      STOP BANG         11/16/2023     2:43 PM   STOP BANG Questionnaire (  2008, the American Society of Anesthesiologists, Inc. [employer]   1. Snoring - Do you snore loudly (louder than talking or loud enough to be heard through closed doors)? Yes   2. Tired - Do you often feel tired, fatigued, or sleepy during daytime? Yes   3. Observed - Has anyone observed you stop breathing during your sleep? Yes   4. Blood pressure - Do you have or are you being treated for high blood pressure? No   5. BMI - BMI more than 35 kg/m2? No   6. Age - Age over 50 yr old? No   7. Neck circumference - Neck circumference greater than 40 cm? No   8. Gender - Gender male? Yes   STOP BANG Score (MC): 3 (High risk of AJ)         GAD7         No data to display                  CAGE-AID         No data to display                CAGE-AID reprinted with permission from the [place] Medical Journal, KAYLAN Yi. and MARY KAY Calvin, \"Conjoint screening questionnaires for alcohol and drug abuse\" [place] Medical Journal 94: 135-140, 1995.      PATIENT HEALTH QUESTIONNAIRE-9 (PHQ - 9)         No data to display                Developed by Catina Jensen, Stefania Roberson, Geraldo Tello and colleagues, with an educational lito from [employer] No permission required to reproduce, translate, display or distribute.        Allergies:    No Known " Allergies    Medications:    Current Outpatient Medications   Medication Sig Dispense Refill    allopurinol (ZYLOPRIM) 100 MG tablet Take 1 tablet (100 mg) by mouth daily 90 tablet 3    indomethacin (INDOCIN) 50 MG capsule TAKE ONE CAPSULE BY MOUTH THREE TIMES A DAY WITH MEALS 60 capsule 6       Problem List:  Patient Active Problem List    Diagnosis Date Noted    Hypertriglyceridemia 10/12/2017     Priority: Medium    Asymptomatic cholelithiasis 12/05/2014     Priority: Medium     Noted on US 12/2014, non obstructing      Infectious mononucleosis 11/14/2014     Priority: Medium     With hepatosplenomegaly on US 11/14/2014.  Improved on US 12/1/2014.      Gout 07/31/2012     Priority: Medium     Problem list name updated by automated process. Provider to review      CARDIOVASCULAR SCREENING; LDL GOAL LESS THAN 160 10/31/2010     Priority: Medium        Past Medical/Surgical History:  Past Medical History:   Diagnosis Date    Allergic rhinitis      Past Surgical History:   Procedure Laterality Date    COLONOSCOPY WITH CO2 INSUFFLATION N/A 9/1/2023    Procedure: Colonoscopy with CO2 insufflation;  Surgeon: Aracely Krishnamurthy DO;  Location:  OR       Social History:  Social History     Socioeconomic History    Marital status:      Spouse name: Not on file    Number of children: Not on file    Years of education: Not on file    Highest education level: Not on file   Occupational History    Not on file   Tobacco Use    Smoking status: Never     Passive exposure: Never    Smokeless tobacco: Never   Vaping Use    Vaping Use: Never used   Substance and Sexual Activity    Alcohol use: Yes     Comment: socially    Drug use: No    Sexual activity: Never   Other Topics Concern    Parent/sibling w/ CABG, MI or angioplasty before 65F 55M? Not Asked   Social History Narrative    Not on file     Social Determinants of Health     Financial Resource Strain: Not on file   Food Insecurity: Not on file   Transportation Needs:  Not on file   Physical Activity: Not on file   Stress: Not on file   Social Connections: Not on file   Interpersonal Safety: Not on file   Housing Stability: Not on file       Family History:  Family History   Problem Relation Age of Onset    Cancer Maternal Grandmother     Cancer Maternal Grandfather        Review of Systems:  A complete review of systems reviewed by me is negative with the exeption of what has been mentioned in the history of present illness.  In the last TWO WEEKS have you experienced any of the following symptoms?  Fevers: No  Night Sweats: No  Weight Gain: No  Pain at Night: No  Double Vision: No  Changes in Vision: No  Difficulty Breathing through Nose: No  Sore Throat in Morning: No  Dry Mouth in the Morning: No  Shortness of Breath Lying Flat: No  Shortness of Breath With Activity: No  Awakening with Shortness of Breath: No  Increased Cough: No  Heart Racing at Night: No  Swelling in Feet or Legs: No  Diarrhea at Night: No  Heartburn at Night: No  Urinating More than Once at Night: No  Losing Control of Urine at Night: No  Joint Pains at Night: No  Headaches in Morning: No  Weakness in Arms or Legs: No  Depressed Mood: No  Anxiety: No     Physical Examination:  Vitals: /74   Pulse 64   Ht 1.829 m (6')   Wt 87.5 kg (193 lb)   SpO2 97%   BMI 26.18 kg/m    BMI= Body mass index is 26.18 kg/m .    Neck Cir (cm): 38 cm      GENERAL APPEARANCE: healthy, alert, and no distress  EYES: Eyes grossly normal to inspection, PERRL, and conjunctivae and sclerae normal  HENT: oropharynx crowded and tongue base enlarged  NECK: no asymmetry, masses, or scars and thyroid normal to palpation  RESP: lungs clear to auscultation - no rales, rhonchi or wheezes  CV: regular rates and rhythm, normal S1 S2, no S3 or S4, and no murmur, click or rub  MS: extremities normal- no gross deformities noted  NEURO: mentation intact and speech normal  PSYCH: affect normal/bright  Mallampati Class: III.  Tonsillar  Stage:          Data: All pertinent previous laboratory data reviewed     Recent Labs   Lab Test 06/13/23  1554      POTASSIUM 5.1   CHLORIDE 105   CO2 21*   ANIONGAP 13   GLC 81   BUN 27.5*   CR 1.15   CATY 10.0       Sonal Del Castillo PA-C 11/16/2023

## 2023-11-16 NOTE — NURSING NOTE
Chief Complaint   Patient presents with    Snoring     Sleepiness on and off during the day.     Sleep Apnea       Initial /74   Pulse 64   Ht 1.829 m (6')   Wt 87.5 kg (193 lb)   SpO2 97%   BMI 26.18 kg/m   Estimated body mass index is 26.18 kg/m  as calculated from the following:    Height as of this encounter: 1.829 m (6').    Weight as of this encounter: 87.5 kg (193 lb).    Medication Reconciliation: complete    Neck circumference: 15 inches / 38 centimeters.    Kristan Gandara CMA on 11/16/2023 at 2:58 PM

## 2023-11-16 NOTE — PATIENT INSTRUCTIONS
"          MY TREATMENT INFORMATION FOR SLEEP APNEA-  Stevie Black    DOCTOR : Sonal Del Castillo PA    Am I having a sleep study at a sleep center?  --->Due to normal delays, you will be contacted within 2-4 weeks to schedule    Am I having a home sleep study?  --->Watch the video for the device you are using:    -/drop off device-   https://www.[employer].com/watch?v=yGGFBdELGhk    -Disposable device sent out require phone/computer application-   https://www.[employer].com/watch?v=BCce_vbiwxE      Frequently asked questions:  1. What is Obstructive Sleep Apnea (AJ)? AJ is the most common type of sleep apnea. Apnea means, \"without breath.\"  Apnea is most often caused by narrowing or collapse of the upper airway as muscles relax during sleep.   Almost everyone has occasional apneas. Most people with sleep apnea have had brief interruptions at night frequently for many years.  The severity of sleep apnea is related to how frequent and severe the events are.   2. What are the consequences of AJ? Symptoms include: feeling sleepy during the day, snoring loudly, gasping or stopping of breathing, trouble sleeping, and occasionally morning headaches or heartburn at night.  Sleepiness can be serious and even increase the risk of falling asleep while driving. Other health consequences may include development of high blood pressure and other cardiovascular disease in persons who are susceptible. Untreated AJ  can contribute to heart disease, stroke and diabetes.   3. What are the treatment options? In most situations, sleep apnea is a lifelong disease that must be managed with daily therapy. Medications are not effective for sleep apnea and surgery is generally not considered until other therapies have been tried. Your treatment is your choice . Continuous Positive Airway (CPAP) works right away and is the therapy that is effective in nearly everyone. An oral device to hold your jaw forward is usually the next most " reliable option. Other options include postioning devices (to keep you off your back), weight loss, and surgery including a tongue pacing device. There is more detail about some of these options below.  4. Are my sleep studies covered by insurance? Although we will request verification of coverage, we advise you also check in advance of the study to ensure there is coverage.    Important tips for those choosing CPAP and similar devices  For new devices, sign up for device BOZENA to monitor your device for your followup visits  We encourage you to utilize the [employer] bozena or website (myAir web (resmed.com) ) to monitor your therapy progress and share the data with your healthcare team when you discuss your sleep apnea.                                                    Know your equipment:  CPAP is continuous positive airway pressure that prevents obstructive sleep apnea by keeping the throat from collapsing while you are sleeping. In most cases, the device is  smart  and can slowly self-adjusts if your throat collapses and keeps a record every day of how well you are treated-this information is available to you and your care team.  BPAP is bilevel positive airway pressure that keeps your throat open and also assists each breath with a pressure boost to maintain adequate breathing.  Special kinds of BPAP are used in patients who have inadequate breathing from lung or heart disease. In most cases, the device is  smart  and can slowly self-adjusts to assist breathing. Like CPAP, the device keeps a record of how well you are treated.  Your mask is your connection to the device. You get to choose what feels most comfortable and the staff will help to make sure if fits. Here: are some examples of the different masks that are available:       Key points to remember on your journey with sleep apnea:  Sleep study.  PAP devices often need to be adjusted during a sleep study to show that they are effective and adjusted  right.  Good tips to remember: Try wearing just the mask during a quiet time during the day so your body adapts to wearing it. A humidifier is recommended for comfort in most cases to prevent drying of your nose and throat. Allergy medication from your provider may help you if you are having nasal congestion.  Getting settled-in. It takes more than one night for most of us to get used to wearing a mask. Try wearing just the mask during a quiet time during the day so your body adapts to wearing it. A humidifier is recommended for comfort in most cases. Our team will work with you carefully on the first day and will be in contact within 4 days and again at 2 and 4 weeks for advice and remote device adjustments. Your therapy is evaluated by the device each day.   Use it every night. The more you are able to sleep naturally for 7-8 hours, the more likely you will have good sleep and to prevent health risks or symptoms from sleep apnea. Even if you use it 4 hours it helps. Occasionally all of us are unable to use a medical therapy, in sleep apnea, it is not dangerous to miss one night.   Communicate. Call our skilled team on the number provided on the first day if your visit for problems that make it difficult to wear the device. Over 2 out of 3 patients can learn to wear the device long-term with help from our team. Remember to call our team or your sleep providers if you are unable to wear the device as we may have other solutions for those who cannot adapt to mask CPAP therapy. It is recommended that you sleep your sleep provider within the first 3 months and yearly after that if you are not having problems.   Use it for your health. We encourage use of CPAP masks during daytime quiet periods to allow your face and brain to adapt to the sensation of CPAP so that it will be a more natural sensation to awaken to at night or during naps. This can be very useful during the first few weeks or months of adapting to CPAP  though it does not help medically to wear CPAP during wakefulness and  should not be used as a strategy just to meet guidelines.  Take care of your equipment. Make sure you clean your mask and tubing using directions every day and that your filter and mask are replaced as recommended or if they are not working.     BESIDES CPAP, WHAT OTHER THERAPIES ARE THERE?    Positioning Device  Positioning devices are generally used when sleep apnea is mild and only occurs on your back.This example shows a pillow that straps around the waist. It may be appropriate for those whose sleep study shows milder sleep apnea that occurs primarily when lying flat on one's back. Preliminary studies have shown benefit but effectiveness at home may need to be verified by a home sleep test. These devices are generally not covered by medical insurance.  Examples of devices that maintain sleeping on the back to prevent snoring and mild sleep apnea.    Belt type body positioner  http://[employer]/    Electronic reminder  http://nightshifttherapy.[employer]/            Oral Appliance  What is oral appliance therapy?  An oral appliance device fits on your teeth at night like a retainer used after having braces. The device is made by a specialized dentist and requires several visits over 1-2 months before a manufactured device is made to fit your teeth and is adjusted to prevent your sleep apnea. Once an oral device is working properly, snoring should be improved. A home sleep test may be recommended at that time if to determine whether the sleep apnea is adequately treated.       Some things to remember:  -Oral devices are often, but not always, covered by your medical insurance. Be sure to check with your insurance provider.   -If you are referred for oral therapy, you will be given a list of specialized dentists to consider or you may choose to visit the Web site of the American Academy of Dental Sleep Medicine  -Oral devices are less likely to work  if you have severe sleep apnea or are extremely overweight.     More detailed information  An oral appliance is a small acrylic device that fits over the upper and lower teeth  (similar to a retainer or a mouth guard). This device slightly moves jaw forward, which moves the base of the tongue forward, opens the airway, improves breathing for effective treat snoring and obstructive sleep apnea in perhaps 7 out of 10 people .  The best working devices are custom-made by a dental device  after a mold is made of the teeth 1, 2, 3.  When is an oral appliance indicated?  Oral appliance therapy is recommended as a first-line treatment for patients with primary snoring, mild sleep apnea, and for patients with moderate sleep apnea who prefer appliance therapy to use of CPAP4, 5. Severity of sleep apnea is determined by sleep testing and is based on the number of respiratory events per hour of sleep.   How successful is oral appliance therapy?  The success rate of oral appliance therapy in patients with mild sleep apnea is 75-80% while in patients with moderate sleep apnea it is 50-70%. The chance of success in patients with severe sleep apnea is 40-50%. The research also shows that oral appliances have a beneficial effect on the cardiovascular health of AJ patients at the same magnitude as CPAP therapy7.  Oral appliances should be a second-line treatment in cases of severe sleep apnea, but if not completely successful then a combination therapy utilizing CPAP plus oral appliance therapy may be effective. Oral appliances tend to be effective in a broad range of patients although studies show that the patients who have the highest success are females, younger patients, those with milder disease, and less severe obesity. 3, 6.   Finding a dentist that practices dental sleep medicine  Specific training is available through the American Academy of Dental Sleep Medicine for dentists interested in working in the field  of sleep. To find a dentist who is educated in the field of sleep and the use of oral appliances, near you, visit the Web site of the American Academy of Dental Sleep Medicine.    References  1. Palmira et al. Objectively measured vs self-reported compliance during oral appliance therapy for sleep-disordered breathing. Chest 2013; 144(5): 6782-1670.  2. Hilary, et al. Objective measurement of compliance during oral appliance therapy for sleep-disordered breathing. Thorax 2013; 68(1): 91-96.  3. Johana et al. Mandibular advancement devices in 620 men and women with AJ and snoring: tolerability and predictors of treatment success. Chest 2004; 125: 9235-1824.  4. Joana et al. Oral appliances for snoring and AJ: a review. Sleep 2006; 29: 244-262.  5. Nataliia et al. Oral appliance treatment for AJ: an update. J Clin Sleep Med 2014; 10(2): 215-227.  6. Dre et al. Predictors of OSAH treatment outcome. J Dent Res 2007; 86: 7598-6785.      Weight Loss:    Weight loss is a long-term strategy that may improve sleep apnea in some patients.    Weight management is a personal decision and the decision should be based on your interest and the potential benefits.  If you are interested in exploring weight loss strategies, the following discussion covers the impact on weight loss on sleep apnea and the approaches that may be successful.    Being overweight does not necessarily mean you will have health consequences.  Those who have BMI over 35 or over 27 with existing medical conditions carries greater risk.   Weight loss decreases severity of sleep apnea in most people with obesity. For those with mild obesity who have developed snoring with weight gain, even 15-30 pound weight loss can improve and occasionally eliminate sleep apnea.  Structured and life-long dietary and health habits are necessary to lose weight and keep healthier weight levels.     Though there may be significant health benefits from  weight loss, long-term weight loss is very difficult to achieve- studies show success with dietary management in less than 10% of people. In addition, substantial weight loss may require years of dietary control and may be difficult if patients have severe obesity. In these cases, surgical management may be considered.  Finally, older individuals who have tolerated obesity without health complications may be less likely to benefit from weight loss strategies.      [unfilled]    Surgery:    Surgery for obstructive sleep apnea is considered generally only when other therapies fail to work. Surgery may be discussed with you if you are having a difficult time tolerating CPAP and or when there is an abnormal structure that requires surgical correction.  Nose and throat surgeries often enlarge the airway to prevent collapse.  Most of these surgeries create pain for 1-2 weeks and up to half of the most common surgeries are not effective throughout life.  You should carefully discuss the benefits and drawbacks to surgery with your sleep provider and surgeon to determine if it is the best solution for you.   More information  Surgery for AJ is directed at areas that are responsible for narrowing or complete obstruction of the airway during sleep.  There are a wide range of procedures available to enlarge and/or stabilize the airway to prevent blockage of breathing in the three major areas where it can occur: the palate, tongue, and nasal regions.  Successful surgical treatment depends on the accurate identification of the factors responsible for obstructive sleep apnea in each person.  A personalized approach is required because there is no single treatment that works well for everyone.  Because of anatomic variation, consultation with an examination by a sleep surgeon is a critical first step in determining what surgical options are best for each patient.  In some cases, examination during sedation may be recommended in  order to guide the selection of procedures.  Patients will be counseled about risks and benefits as well as the typical recovery course after surgery. Surgery is typically not a cure for a person s AJ.  However, surgery will often significantly improve one s AJ severity (termed  success rate ).  Even in the absence of a cure, surgery will decrease the cardiovascular risk associated with OSA7; improve overall quality of life8 (sleepiness, functionality, sleep quality, etc).      Palate Procedures:  Patients with AJ often have narrowing of their airway in the region of their tonsils and uvula.  The goals of palate procedures are to widen the airway in this region as well as to help the tissues resist collapse.  Modern palate procedure techniques focus on tissue conservation and soft tissue rearrangement, rather than tissue removal.  Often the uvula is preserved in this procedure. Residual sleep apnea is common in patient after pharyngoplasty with an average reduction in sleep apnea events of 33%2.      Tongue Procedures:  ExamWhile patients are awake, the muscles that surround the throat are active and keep this region open for breathing. These muscles relax during sleep, allowing the tongue and other structures to collapse and block breathing.  There are several different tongue procedures available.  Selection of a tongue base procedure depends on characteristics seen on physical exam.  Generally, procedures are aimed at removing bulky tissues in this area or preventing the back of the tongue from falling back during sleep.  Success rates for tongue surgery range from 50-62%3.    Hypoglossal Nerve Stimulation:  Hypoglossal nerve stimulation has recently received approval from the [place] Food and Drug Administration for the treatment of obstructive sleep apnea.  This is based on research showing that the system was safe and effective in treating sleep apnea6.  Results showed that the median AHI score  decreased 68%, from 29.3 to 9.0. This therapy uses an implant system that senses breathing patterns and delivers mild stimulation to airway muscles, which keeps the airway open during sleep.  The system consists of three fully implanted components: a small generator (similar in size to a pacemaker), a breathing sensor, and a stimulation lead.  Using a small handheld remote, a patient turns the therapy on before bed and off upon awakening.    Candidates for this device must be greater than 18 years of age, have moderate to severe AJ (AHI between 15-65), BMI less than 35, have tried CPAP/oral appliance for at least 8 weeks without success, and have appropriate upper airway anatomy (determined by a sleep endoscopy performed by Dr. Brody Main).    Hypoglossal Nerve Stimulation Pathway:    The sleep surgeon s office will work with the patient through the insurance prior-authorization process (including communications and appeals).    Nasal Procedures:  Nasal obstruction can interfere with nasal breathing during the day and night.  Studies have shown that relief of nasal obstruction can improve the ability of some patients to tolerate positive airway pressure therapy for obstructive sleep apnea1.  Treatment options include medications such as nasal saline, topical corticosteroid and antihistamine sprays, and oral medications such as antihistamines or decongestants. Non-surgical treatments can include external nasal dilators for selected patients. If these are not successful by themselves, surgery can improve the nasal airway either alone or in combination with these other options.      Combination Procedures:  Combination of surgical procedures and other treatments may be recommended, particularly if patients have more than one area of narrowing or persistent positional disease.  The success rate of combination surgery ranges from 66-80%2,3.    References  Luisito DAVIDSON. The Role of the Nose in Snoring and Obstructive  Sleep Apnoea: An Update.  Eur Arch Otorhinolaryngol. 2011; 268: 1365-73.   Morales SM; Duane JA; Tripp JR; Pallanch JF; Jayleen MB; Fatmata SG; Sujatha SWAN. Surgical modifications of the upper airway for obstructive sleep apnea in adults: a systematic review and meta-analysis. SLEEP 2010;33(10):6134-5704. Jan FOX. Hypopharyngeal surgery in obstructive sleep apnea: an evidence-based medicine review.  Arch Otolaryngol Head Neck Surg. 2006 Feb;132(2):206-13.  Levon YH1, Angel Y, Stef BRITNEY. The efficacy of anatomically based multilevel surgery for obstructive sleep apnea. Otolaryngol Head Neck Surg. 2003 Oct;129(4):327-35.  Kezirian E, Goldberg A. Hypopharyngeal Surgery in Obstructive Sleep Apnea: An Evidence-Based Medicine Review. Arch Otolaryngol Head Neck Surg. 2006 Feb;132(2):206-13.  Edgar VALENTINE et al. Upper-Airway Stimulation for Obstructive Sleep Apnea.  N Engl J Med. 2014 Jan 9;370(2):139-49.  Peker Y et al. Increased Incidence of Cardiovascular Disease in Middle-aged Men with Obstructive Sleep Apnea. Am J Respir Crit Care Med; 2002 166: 159-165  Spann EM et al. Studying Life Effects and Effectiveness of Palatopharyngoplasty (SLEEP) study: Subjective Outcomes of Isolated Uvulopalatopharyngoplasty. Otolaryngol Head Neck Surg. 2011; 144: 623-631.        WHAT IF I ONLY HAVE SNORING?    Mandibular advancement devices, lateral sleep positioning, long-term weight loss and treatment of nasal allergies have been shown to improve snoring.  Exercising tongue muscles with a game (https://apps.[employer]Adviesmanager.nl/us/bozena/soundly-reduce-snoring/wl5988246439) or stimulating the tongue during the day with a device (https://doi.org/10.3390/vzt71500422) have improved snoring in some individuals.    Remember to Drive Safe... Drive Alive     Sleep health profoundly affects your health, mood, and your safety.  Thirty three percent of the population (one in three of us) is not getting enough sleep and many have a sleep disorder. Not getting  enough sleep or having an untreated / undertreated sleep condition may make us sleepy without even knowing it. In fact, our driving could be dramatically impaired due to our sleep health. As your provider, here are some things I would like you to know about driving:     Here are some warning signs for impairment and dangerous drowsy driving:              -Having been awake more than 16 hours               -Looking tired               -Eyelid drooping              -Head nodding (it could be too late at this point)              -Driving for more than 30 minutes     Some things you could do to make the driving safer if you are experiencing some drowsiness:              -Stop driving and rest              -Call for transportation              -Make sure your sleep disorder is adequately treated     Some things that have been shown NOT to work when experiencing drowsiness while driving:              -Turning on the radio              -Opening windows              -Eating any  distracting  /  entertaining  foods (e.g., sunflower seeds, candy, or any other)              -Talking on the phone      Your decision may not only impact your life, but also the life of others. Please, remember to drive safe for yourself and all of us.

## 2023-11-22 ASSESSMENT — SLEEP AND FATIGUE QUESTIONNAIRES
HOW LIKELY ARE YOU TO NOD OFF OR FALL ASLEEP WHILE SITTING QUIETLY AFTER LUNCH WITHOUT ALCOHOL: MODERATE CHANCE OF DOZING
HOW LIKELY ARE YOU TO NOD OFF OR FALL ASLEEP WHEN YOU ARE A PASSENGER IN A CAR FOR AN HOUR WITHOUT A BREAK: SLIGHT CHANCE OF DOZING
HOW LIKELY ARE YOU TO NOD OFF OR FALL ASLEEP WHILE SITTING AND TALKING TO SOMEONE: SLIGHT CHANCE OF DOZING
HOW LIKELY ARE YOU TO NOD OFF OR FALL ASLEEP WHILE SITTING AND READING: HIGH CHANCE OF DOZING
HOW LIKELY ARE YOU TO NOD OFF OR FALL ASLEEP IN A CAR, WHILE STOPPED FOR A FEW MINUTES IN TRAFFIC: WOULD NEVER DOZE
HOW LIKELY ARE YOU TO NOD OFF OR FALL ASLEEP WHILE WATCHING TV: HIGH CHANCE OF DOZING
HOW LIKELY ARE YOU TO NOD OFF OR FALL ASLEEP WHILE SITTING INACTIVE IN A PUBLIC PLACE: MODERATE CHANCE OF DOZING
HOW LIKELY ARE YOU TO NOD OFF OR FALL ASLEEP WHILE LYING DOWN TO REST IN THE AFTERNOON WHEN CIRCUMSTANCES PERMIT: HIGH CHANCE OF DOZING

## 2023-11-28 ENCOUNTER — OFFICE VISIT (OUTPATIENT)
Dept: SLEEP MEDICINE | Facility: CLINIC | Age: 48
End: 2023-11-28
Payer: COMMERCIAL

## 2023-11-28 DIAGNOSIS — R06.83 SNORING: ICD-10-CM

## 2023-11-28 DIAGNOSIS — Z72.820 LACK OF ADEQUATE SLEEP: ICD-10-CM

## 2023-11-28 DIAGNOSIS — R53.83 MALAISE AND FATIGUE: ICD-10-CM

## 2023-11-28 DIAGNOSIS — R53.81 MALAISE AND FATIGUE: ICD-10-CM

## 2023-11-28 DIAGNOSIS — R06.89 DYSPNEA AND RESPIRATORY ABNORMALITY: ICD-10-CM

## 2023-11-28 DIAGNOSIS — G47.30 SLEEP APNEA, UNSPECIFIED TYPE: ICD-10-CM

## 2023-11-28 DIAGNOSIS — R06.00 DYSPNEA AND RESPIRATORY ABNORMALITY: ICD-10-CM

## 2023-11-28 PROCEDURE — G0399 HOME SLEEP TEST/TYPE 3 PORTA: HCPCS | Performed by: INTERNAL MEDICINE

## 2023-11-28 NOTE — PROGRESS NOTES
Pt is completing a home sleep test. Pt was instructed on how to put on the Noxturnal T3 device and associated equipment before going to bed and given the opportunity to practice putting it on before leaving the sleep center. Pt was reminded to bring the home sleep test kit back to the center tomorrow, at agreed upon time for download and reporting.   Neck circumference: 38 CM / 15 inches.  Katharina Aguila CMA, HST Specialist  [place] [facility]

## 2023-11-29 ENCOUNTER — DOCUMENTATION ONLY (OUTPATIENT)
Dept: SLEEP MEDICINE | Facility: CLINIC | Age: 48
End: 2023-11-29
Payer: COMMERCIAL

## 2023-11-29 NOTE — PROGRESS NOTES
HST POST-STUDY QUESTIONNAIRE    What time did you go to bed?  21:00  How long do you think it took to fall asleep?  1.- 1.5 hours  What time did you wake up to start the day?  04:55  Did you get up during the night at all?  no  If you woke up, do you remember approximately what time(s)?   Did you have any difficulty with the equipment?  No  Did you us any type of treatment with this study?  None  Was the head of the bed elevated? No  Did you sleep in a recliner?  No  Did you stop using CPAP at least 3 days before this test?  NA  Any other information you'd like us to know?

## 2023-11-29 NOTE — PROGRESS NOTES
This HSAT was performed using a Noxturnal T3 device which recorded snore, sound, movement activity, body position, nasal pressure, oronasal thermal airflow, pulse, oximetry and both chest and abdominal respiratory effort. HSAT data was restricted to the time patient states they were in bed.     HSAT was scored using 1B 4% hypopnea rule.     HST AHI (Non-PAT): 61.2  Snoring was reported as moderate.  Time with SpO2 below 89% was 8.5 minutes.   Overall signal quality was good     Pt will follow up with sleep provider to determine appropriate therapy.

## 2023-11-30 NOTE — PROCEDURES
HOME SLEEP STUDY INTERPRETATION        Patient: Stevie Black  MRN: [id number]  YOB: 1975  Study Date: 11/28/2023  PCP/Referring Provider: Ngozi Cox;   Ordering Provider:   Sonal Del Castillo PA-C         Indications for Home Study: Stevie Black is a 48 year old male with symptoms suggestive of obstructive sleep apnea.    Estimated body mass index is 26.18 kg/m  as calculated from the following:    Height as of 11/16/23: 1.829 m (6').    Weight as of 11/16/23: 87.5 kg (193 lb).  Total score - [place]: 15 (11/22/2023  3:45 PM)  [employer] Total Score: 4 (11/16/2023  3:00 PM)Total Score: 4 (11/22/2023  3:45 PM)        Data: A full night home sleep study was performed recording the standard physiologic parameters including body position, movement, sound, nasal pressure, thermal oral airflow, chest and abdominal movements with respiratory inductance plethysmography, and oxygen saturation by pulse oximetry. Pulse rate was estimated by oximetry recording. This study was considered adequate based on > 4 hours of quality oximetry and respiratory recording. As specified by the AASM Manual for the Scoring of Sleep and Associated events, version 2.3, Rule VIII.D 1B, 4% oxygen desaturation scoring for hypopneas is used as a standard of care on all home sleep apnea testing.        Analysis Time:  473 minutes        Respiration:   Sleep Associated Hypoxemia: sustained hypoxemia was not present. Baseline oxygen saturation was 96%.  Time with saturation less than or equal to 88% was 4 minutes. The lowest oxygen saturation was 83%.   Snoring: Snoring was present.  Respiratory events: The home study revealed a presence of 293 obstructive apneas and 127 mixed and central apneas. There were 63 hypopneas resulting in a combined apnea/hypopnea index [AHI] of 61 events per hour.  AHI was 87 per hour supine, 88 per hour prone, 30 per hour on left side, and 66 per hour on right side.   Pattern: Excluding events  noted above, respiratory rate and pattern was Normal.      Position: Percent of time spent: supine - 13%, prone - 20%, on left - 34%, on right - 33%.      Heart Rate: By pulse oximetry normal rate was noted.       Assessment:   Severe obstructive sleep apnea.  Sleep associated hypoxemia was not present.    Recommendations:  Consider auto-CPAP at 7-18 cmH2O, oral appliance therapy, polysomnography with full night PAP titration, or surgical options.  Suggest optimizing sleep hygiene and avoiding sleep deprivation.  Weight management.        Diagnosis Code(s): Obstructive Sleep Apnea G47.33    Electronically signed by: Gera Agosto MD, November 29, 2023   Diplomate, American Board of Internal Medicine, Sleep Medicine

## 2023-12-26 ENCOUNTER — MYC MEDICAL ADVICE (OUTPATIENT)
Dept: SLEEP MEDICINE | Facility: CLINIC | Age: 48
End: 2023-12-26
Payer: COMMERCIAL

## 2023-12-26 DIAGNOSIS — G47.33 OSA (OBSTRUCTIVE SLEEP APNEA): Primary | ICD-10-CM

## 2024-01-09 ENCOUNTER — DOCUMENTATION ONLY (OUTPATIENT)
Dept: SLEEP MEDICINE | Facility: CLINIC | Age: 49
End: 2024-01-09
Payer: COMMERCIAL

## 2024-01-09 DIAGNOSIS — G47.33 OSA (OBSTRUCTIVE SLEEP APNEA): Primary | ICD-10-CM

## 2024-01-09 NOTE — PROGRESS NOTES
Patient was offered choice of vendor and chose [facility].  Patient Stevie Black was set up at [facility] on January 9, 2024. Patient received a Resmed Airsense 11 Pressures were set at  7-18 cm H2O.   Patient s ramp is 5 cm H2O for 30 min and FLEX/EPR is EPR, 2.  Patient received a Resmed Mask name: AIRFIT P30I  Pillow mask size Medium, heated tubing and heated humidifier.  Patient has the following compliance requirements: none  Patient has a follow up on 3/5/2024 with LIDYA Wakefield

## 2024-01-18 ENCOUNTER — DOCUMENTATION ONLY (OUTPATIENT)
Dept: SLEEP MEDICINE | Facility: CLINIC | Age: 49
End: 2024-01-18
Payer: COMMERCIAL

## 2024-01-18 NOTE — PROGRESS NOTES
3 day Sleep therapy management telephone visit    Diagnostic AHI:  61.2 HST    Confirmed with patient at time of call- N/A Patient is still interested in STM service       Message left for patient to return call.        Objective data     Order Settings for PAP  CPAP min     CPAP max              Device settings from machine CPAP min 7.0     CPAP max 18.0           EPR Setting TWO    RESMED soft response  OFF     Assessment: Nightly usage over four hours      Action plan: Patient to have 14 day STM visit. Patient has a follow up visit scheduled:   yes within 31-90 days of set up    Replacement device: No  STM ordered by provider: Yes     Total time spent on accessing and  interpreting remote patient PAP therapy data  10 minutes    Total time spent counseling, coaching  and reviewing PAP therapy data with patient  1 minutes    [id number] no

## 2024-02-09 ENCOUNTER — DOCUMENTATION ONLY (OUTPATIENT)
Dept: SLEEP MEDICINE | Facility: CLINIC | Age: 49
End: 2024-02-09
Payer: COMMERCIAL

## 2024-02-09 NOTE — PROGRESS NOTES
14  DAY STM VISIT    Diagnostic AHI:  61.2 HST    Data only recheck     Assessment: Pt meeting objective benchmarks.       Action plan: pt to have 6 month STM visit      Device type: Auto-CPAP    PAP settings: CPAP min 7.0 cm  H20       CPAP max 18.0 cm  H20      95th% pressure 9.5 cm  H20        RESMED EPR level Setting: TWO    RESMED Soft response setting:  OFF    Mask type:  Nasal Mask    Objective measures: 14 day rolling measures      Compliance  85 %      Leak  8.49  lpm  last  upload      AHI 5.33   last  upload      Average number of minutes 397      Objective measure goal  Compliance   Goal >70%  Leak   Goal < 24 lpm  AHI  Goal < 5  Usage  Goal >240        Total time spent on accessing and interpreting remote patient PAP therapy data  10 minutes    Total time spent counseling, coaching  and reviewing PAP therapy data with patient  0 minutes    [id number]  [id number]  no (3 day STM)

## 2024-03-04 PROBLEM — G47.33 OSA (OBSTRUCTIVE SLEEP APNEA): Chronic | Status: ACTIVE | Noted: 2024-03-04

## 2024-03-05 ENCOUNTER — VIRTUAL VISIT (OUTPATIENT)
Dept: SLEEP MEDICINE | Facility: CLINIC | Age: 49
End: 2024-03-05
Payer: COMMERCIAL

## 2024-03-05 VITALS — WEIGHT: 190 LBS | BODY MASS INDEX: 25.73 KG/M2 | HEIGHT: 72 IN

## 2024-03-05 DIAGNOSIS — G47.33 OSA (OBSTRUCTIVE SLEEP APNEA): Primary | ICD-10-CM

## 2024-03-05 PROCEDURE — 99213 OFFICE O/P EST LOW 20 MIN: CPT | Mod: 95 | Performed by: PHYSICIAN ASSISTANT

## 2024-03-05 ASSESSMENT — SLEEP AND FATIGUE QUESTIONNAIRES
HOW LIKELY ARE YOU TO NOD OFF OR FALL ASLEEP WHILE SITTING INACTIVE IN A PUBLIC PLACE: SLIGHT CHANCE OF DOZING
HOW LIKELY ARE YOU TO NOD OFF OR FALL ASLEEP WHILE LYING DOWN TO REST IN THE AFTERNOON WHEN CIRCUMSTANCES PERMIT: HIGH CHANCE OF DOZING
HOW LIKELY ARE YOU TO NOD OFF OR FALL ASLEEP WHILE SITTING AND READING: HIGH CHANCE OF DOZING
HOW LIKELY ARE YOU TO NOD OFF OR FALL ASLEEP WHILE SITTING AND TALKING TO SOMEONE: SLIGHT CHANCE OF DOZING
HOW LIKELY ARE YOU TO NOD OFF OR FALL ASLEEP WHEN YOU ARE A PASSENGER IN A CAR FOR AN HOUR WITHOUT A BREAK: SLIGHT CHANCE OF DOZING
HOW LIKELY ARE YOU TO NOD OFF OR FALL ASLEEP IN A CAR, WHILE STOPPED FOR A FEW MINUTES IN TRAFFIC: WOULD NEVER DOZE
HOW LIKELY ARE YOU TO NOD OFF OR FALL ASLEEP WHILE SITTING QUIETLY AFTER LUNCH WITHOUT ALCOHOL: WOULD NEVER DOZE
HOW LIKELY ARE YOU TO NOD OFF OR FALL ASLEEP WHILE WATCHING TV: HIGH CHANCE OF DOZING

## 2024-03-05 ASSESSMENT — PAIN SCALES - GENERAL: PAINLEVEL: NO PAIN (0)

## 2024-03-05 NOTE — PROGRESS NOTES
Added Note: Provider Note Read Only    Virtual Visit Details    Type of service:  Video Visit     Originating Location (pt. Location): Home    Distant Location (provider location):  On-site  Platform used for Video Visit: Doximity and Amwell

## 2024-03-05 NOTE — NURSING NOTE
Has patient had flu shot for current/most recent flu season? If so, when? No      Is the patient currently in the state of [place]? YES    Visit mode:VIDEO    If the visit is dropped, the patient can be reconnected by: VIDEO VISIT: Text to cell phone:   Telephone Information:   Mobile [phone]       Will anyone else be joining the visit? YES: How would they like to receive their invitation? Text to cell phone: joining with patient  (If patient encounters technical issues they should call [phone])    How would you like to obtain your AVS? MyChart    Are changes needed to the allergy or medication list? No    Reason for visit: RECHECK    Joyce ESPINAL

## 2024-03-05 NOTE — PATIENT INSTRUCTIONS
Your Body mass index is 25.77 kg/m .  Weight management is a personal decision.  If you are interested in exploring weight loss strategies, the following discussion covers the approaches that may be successful. Body mass index (BMI) is one way to tell whether you are at a healthy weight, overweight, or obese. It measures your weight in relation to your height.  A BMI of 18.5 to 24.9 is in the healthy range. A person with a BMI of 25 to 29.9 is considered overweight, and someone with a BMI of 30 or greater is considered obese. More than two-thirds of American adults are considered overweight or obese.  Being overweight or obese increases the risk for further weight gain. Excess weight may lead to heart disease and diabetes.  Creating and following plans for healthy eating and physical activity may help you improve your health.  Weight control is part of healthy lifestyle and includes exercise, emotional health, and healthy eating habits. Careful eating habits lifelong are the mainstay of weight control. Though there are significant health benefits from weight loss, long-term weight loss with diet alone may be very difficult to achieve- studies show long-term success with dietary management in less than 10% of people. Attaining a healthy weight may be especially difficult to achieve in those with severe obesity. In some cases, medications, devices and surgical management might be considered.  What can you do?  If you are overweight or obese and are interested in methods for weight loss, you should discuss this with your provider.   Consider reducing daily calorie intake by 500 calories.   Keep a food journal.   Avoiding skipping meals, consider cutting portions instead.    Diet combined with exercise helps maintain muscle while optimizing fat loss. Strength training is particularly important for building and maintaining muscle mass. Exercise helps reduce stress, increase energy, and improves fitness. Increasing  exercise without diet control, however, may not burn enough calories to loose weight.     Start walking three days a week 10-20 minutes at a time  Work towards walking thirty minutes five days a week   Eventually, increase the speed of your walking for 1-2 minutes at time    In addition, we recommend that you review healthy lifestyles and methods for weight loss available through the National Institutes of Health patient information sites:  http://win.niddk.nih.gov/publications/index.htm    And look into health and wellness programs that may be available through your health insurance provider, employer, local community center, or jamar club.

## 2024-03-05 NOTE — PROGRESS NOTES
Sleep Apnea - Follow-up Visit:    Impression/Plan:  Severe obstructive sleep apnea-  Excellent CPAP compliance and AHI is well controlled on CPAP 7-18 cm/H20. Daytime symptoms are improved.   Continue current treatment.   Comprehensive DME order placed.      Stevie Black will follow up in about 2 years or sooner if any concerns     20 minutes spent on day of encounter doing chart review,  history and exam, counseling, coordinating plan of care, documentation and further activities as noted above.       Sonal Del Castillo PA-C  Sleep Medicine     History of Present Illness:  Chief Complaint   Patient presents with    RECHECK    CPAP Follow Up       Stevie Black presents for follow-up of their severe sleep apnea, managed with CPAP.     He initially presented with  loud snoring, witnessed apnea, non-refreshing sleep, daytime sleepiness (ESS 12) and crowded oropharynx.     Home sleep study done at [facility] on 11/28/2023 (193#)-combined apnea/hypopnea index [AHI] of 61 events per hour.  AHI was 87 per hour supine, 88 per hour prone, 30 per hour on left side, and 66 per hour on right side. Baseline oxygen saturation was 96%.  Time with saturation less than or equal to 88% was 4 minutes. The lowest oxygen saturation was 83%.     January 9, 2024. Patient received a Resmed Airsense 11 Pressures were set at  7-18 cm H2O.    Do you use a CPAP Machine at home:  yes  Overall, on a scale of 0-10 how would you rate your CPAP (0 poor, 10 great):  7    What type of mask do you use:  nasal pillow  Is your mask comfortable:  yes  If not, why:      Is your mask leaking:  no  If yes, where do you feel it:    How many night per week does the mask leak (0-7):      Do you notice snoring with mask on:  no  Do you notice gasping arousals with mask on:  no  Are you having significant oral or nasal dryness:  no  Is the pressure setting comfortable:  yes  If not, why:      What is your typical bedtime:  9 pm  How long does it take you to go  to sleep on PAP therapy:  5 minutes  What time do you typically get out of bed for the day:  5 am  How many hours on average per night are you using PAP therapy:  7.5  How many hours are you sleeping per night:  7.5  Do you feel well rested in the morning:  -      ResMed   Auto-PAP 7.0 - 18.0 cmH2O 30 day usage data:    100% of days with > 4 hours of use. 0/30 days with no use.   Average use 454 minutes per day.   95%ile Leak 8 L/min.   CPAP 95% pressure 9.3 cm.   AHI 4.07 events per hour.       EPWORTH SLEEPINESS SCALE         3/5/2024     2:46 PM    [employer] Sleepiness Scale ( SHUKRI Mae  [id number]<br>ESS - [place]/English - Final version - 21 Nov 07 - [facility] [employer].)   Sitting and reading High chance of dozing   Watching TV High chance of dozing   Sitting, inactive in a public place (e.g. a theatre or a meeting) Slight chance of dozing   As a passenger in a car for an hour without a break Slight chance of dozing   Lying down to rest in the afternoon when circumstances permit High chance of dozing   Sitting and talking to someone Slight chance of dozing   Sitting quietly after a lunch without alcohol Would never doze   In a car, while stopped for a few minutes in traffic Would never doze   [employer] Score (MC) 12   [employer] Score (Sleep) 12       INSOMNIA SEVERITY INDEX (JAMEL)          3/5/2024     2:45 PM   Insomnia Severity Index (JAMEL)   Difficulty falling asleep 0   Difficulty staying asleep 1   Problems waking up too early 1   How SATISFIED/DISSATISFIED are you with your CURRENT sleep pattern? 1   How NOTICEABLE to others do you think your sleep problem is in terms of impairing the quality of your life? 1   How WORRIED/DISTRESSED are you about your current sleep problem? 1   To what extent do you consider your sleep problem to INTERFERE with your daily functioning (e.g. daytime fatigue, mood, ability to function at work/daily chores, concentration, memory, mood, etc.) CURRENTLY? 1   JAMEL Total Score 6        Guidelines for Scoring/Interpretation:  Total score categories:  0-7 = No clinically significant insomnia   8-14 = Subthreshold insomnia   15-21 = Clinical insomnia (moderate severity)  22-28 = Clinical insomnia (severe)  Used via courtesy of www.SkyWireealth.va.gov with permission from Theodore Chapman PhD., [facility]        Past medical/surgical history, family history, social history, medications and allergies were reviewed.        Problem List:  Patient Active Problem List    Diagnosis Date Noted    AJ (obstructive sleep apnea) 03/04/2024     Priority: Medium     Home sleep study done at [facility] on 11/28/2023 (193#)-combined apnea/hypopnea index [AHI] of 61 events per hour.  AHI was 87 per hour supine, 88 per hour prone, 30 per hour on left side, and 66 per hour on right side. Baseline oxygen saturation was 96%.  Time with saturation less than or equal to 88% was 4 minutes. The lowest oxygen saturation was 83%.       Hypertriglyceridemia 10/12/2017     Priority: Medium    Asymptomatic cholelithiasis 12/05/2014     Priority: Medium     Noted on US 12/2014, non obstructing      Gout 07/31/2012     Priority: Medium        Ht 1.829 m (6')   Wt 86.2 kg (190 lb)   BMI 25.77 kg/m

## 2024-06-16 ENCOUNTER — HEALTH MAINTENANCE LETTER (OUTPATIENT)
Age: 49
End: 2024-06-16

## 2024-06-24 DIAGNOSIS — M10.00 ACUTE IDIOPATHIC GOUT, UNSPECIFIED SITE: ICD-10-CM

## 2024-06-25 RX ORDER — ALLOPURINOL 100 MG/1
100 TABLET ORAL DAILY
Qty: 90 TABLET | Refills: 0 | Status: SHIPPED | OUTPATIENT
Start: 2024-06-25

## 2025-01-09 SDOH — HEALTH STABILITY: PHYSICAL HEALTH: ON AVERAGE, HOW MANY DAYS PER WEEK DO YOU ENGAGE IN MODERATE TO STRENUOUS EXERCISE (LIKE A BRISK WALK)?: 2 DAYS

## 2025-01-09 SDOH — HEALTH STABILITY: PHYSICAL HEALTH: ON AVERAGE, HOW MANY MINUTES DO YOU ENGAGE IN EXERCISE AT THIS LEVEL?: 50 MIN

## 2025-01-09 ASSESSMENT — SOCIAL DETERMINANTS OF HEALTH (SDOH): HOW OFTEN DO YOU GET TOGETHER WITH FRIENDS OR RELATIVES?: ONCE A WEEK

## 2025-01-14 ENCOUNTER — OFFICE VISIT (OUTPATIENT)
Dept: FAMILY MEDICINE | Facility: CLINIC | Age: 50
End: 2025-01-14
Payer: COMMERCIAL

## 2025-01-14 VITALS
SYSTOLIC BLOOD PRESSURE: 135 MMHG | RESPIRATION RATE: 17 BRPM | OXYGEN SATURATION: 98 % | WEIGHT: 199.6 LBS | TEMPERATURE: 97.5 F | HEART RATE: 70 BPM | HEIGHT: 73 IN | BODY MASS INDEX: 26.45 KG/M2 | DIASTOLIC BLOOD PRESSURE: 78 MMHG

## 2025-01-14 DIAGNOSIS — Z11.59 NEED FOR HEPATITIS C SCREENING TEST: ICD-10-CM

## 2025-01-14 DIAGNOSIS — Z13.220 LIPID SCREENING: ICD-10-CM

## 2025-01-14 DIAGNOSIS — E78.1 HYPERTRIGLYCERIDEMIA: ICD-10-CM

## 2025-01-14 DIAGNOSIS — M10.9 ACUTE GOUTY ARTHRITIS: ICD-10-CM

## 2025-01-14 DIAGNOSIS — Z00.00 ROUTINE GENERAL MEDICAL EXAMINATION AT A HEALTH CARE FACILITY: Primary | ICD-10-CM

## 2025-01-14 DIAGNOSIS — G47.33 OSA (OBSTRUCTIVE SLEEP APNEA): ICD-10-CM

## 2025-01-14 DIAGNOSIS — Z28.20 VACCINE REFUSED BY PATIENT: ICD-10-CM

## 2025-01-14 DIAGNOSIS — M10.00 ACUTE IDIOPATHIC GOUT, UNSPECIFIED SITE: ICD-10-CM

## 2025-01-14 DIAGNOSIS — Z11.4 SCREENING FOR HIV (HUMAN IMMUNODEFICIENCY VIRUS): ICD-10-CM

## 2025-01-14 PROCEDURE — 99213 OFFICE O/P EST LOW 20 MIN: CPT | Mod: 25 | Performed by: PREVENTIVE MEDICINE

## 2025-01-14 PROCEDURE — 87389 HIV-1 AG W/HIV-1&-2 AB AG IA: CPT | Performed by: PREVENTIVE MEDICINE

## 2025-01-14 PROCEDURE — 83721 ASSAY OF BLOOD LIPOPROTEIN: CPT | Mod: 59 | Performed by: PREVENTIVE MEDICINE

## 2025-01-14 PROCEDURE — 36415 COLL VENOUS BLD VENIPUNCTURE: CPT | Performed by: PREVENTIVE MEDICINE

## 2025-01-14 PROCEDURE — 80048 BASIC METABOLIC PNL TOTAL CA: CPT | Performed by: PREVENTIVE MEDICINE

## 2025-01-14 PROCEDURE — 84550 ASSAY OF BLOOD/URIC ACID: CPT | Performed by: PREVENTIVE MEDICINE

## 2025-01-14 PROCEDURE — 80061 LIPID PANEL: CPT | Performed by: PREVENTIVE MEDICINE

## 2025-01-14 PROCEDURE — 86803 HEPATITIS C AB TEST: CPT | Performed by: PREVENTIVE MEDICINE

## 2025-01-14 PROCEDURE — 99396 PREV VISIT EST AGE 40-64: CPT | Performed by: PREVENTIVE MEDICINE

## 2025-01-14 RX ORDER — INDOMETHACIN 50 MG/1
CAPSULE ORAL
Qty: 60 CAPSULE | Refills: 6 | Status: SHIPPED | OUTPATIENT
Start: 2025-01-14

## 2025-01-14 RX ORDER — ALLOPURINOL 100 MG/1
100 TABLET ORAL DAILY
Qty: 90 TABLET | Refills: 3 | Status: SHIPPED | OUTPATIENT
Start: 2025-01-14

## 2025-01-14 ASSESSMENT — PAIN SCALES - GENERAL: PAINLEVEL_OUTOF10: NO PAIN (0)

## 2025-01-14 NOTE — PATIENT INSTRUCTIONS
Patient Education   Preventive Care Advice   This is general advice given by our system to help you stay healthy. However, your care team may have specific advice just for you. Please talk to your care team about your preventive care needs.  Nutrition  Eat 5 or more servings of fruits and vegetables each day.  Try wheat bread, brown rice and whole grain pasta (instead of white bread, rice, and pasta).  Get enough calcium and vitamin D. Check the label on foods and aim for 100% of the RDA (recommended daily allowance).  Lifestyle  Exercise at least 150 minutes each week  (30 minutes a day, 5 days a week).  Do muscle strengthening activities 2 days a week. These help control your weight and prevent disease.  No smoking.  Wear sunscreen to prevent skin cancer.  Have a dental exam and cleaning every 6 months.  Yearly exams  See your health care team every year to talk about:  Any changes in your health.  Any medicines your care team has prescribed.  Preventive care, family planning, and ways to prevent chronic diseases.  Shots (vaccines)   HPV shots (up to age 26), if you've never had them before.  Hepatitis B shots (up to age 59), if you've never had them before.  COVID-19 shot: Get this shot when it's due.  Flu shot: Get a flu shot every year.  Tetanus shot: Get a tetanus shot every 10 years.  Pneumococcal, hepatitis A, and RSV shots: Ask your care team if you need these based on your risk.  Shingles shot (for age 50 and up)  General health tests  Diabetes screening:  Starting at age 35, Get screened for diabetes at least every 3 years.  If you are younger than age 35, ask your care team if you should be screened for diabetes.  Cholesterol test: At age 39, start having a cholesterol test every 5 years, or more often if advised.  Bone density scan (DEXA): At age 50, ask your care team if you should have this scan for osteoporosis (brittle bones).  Hepatitis C: Get tested at least once in your life.  STIs (sexually  transmitted infections)  Before age 24: Ask your care team if you should be screened for STIs.  After age 24: Get screened for STIs if you're at risk. You are at risk for STIs (including HIV) if:  You are sexually active with more than one person.  You don't use condoms every time.  You or a partner was diagnosed with a sexually transmitted infection.  If you are at risk for HIV, ask about PrEP medicine to prevent HIV.  Get tested for HIV at least once in your life, whether you are at risk for HIV or not.  Cancer screening tests  Cervical cancer screening: If you have a cervix, begin getting regular cervical cancer screening tests starting at age 21.  Breast cancer scan (mammogram): If you've ever had breasts, begin having regular mammograms starting at age 40. This is a scan to check for breast cancer.  Colon cancer screening: It is important to start screening for colon cancer at age 45.  Have a colonoscopy test every 10 years (or more often if you're at risk) Or, ask your provider about stool tests like a FIT test every year or Cologuard test every 3 years.  To learn more about your testing options, visit:   .  For help making a decision, visit:   https://bit.ly/wb31841.  Prostate cancer screening test: If you have a prostate, ask your care team if a prostate cancer screening test (PSA) at age 55 is right for you.  Lung cancer screening: If you are a current or former smoker ages 50 to 80, ask your care team if ongoing lung cancer screenings are right for you.  For informational purposes only. Not to replace the advice of your health care provider. Copyright   2023 [facility] [employer]. All rights reserved. Clinically reviewed by the [facility] Transitions Program. V[employer]cher[employer] [id number] - REV 01/24.     Patient Education   Preventive Care Advice   This is general advice given by our system to help you stay healthy. However, your care team may have specific advice just for you. Please talk to your care team  about your preventive care needs.  Nutrition  Eat 5 or more servings of fruits and vegetables each day.  Try wheat bread, brown rice and whole grain pasta (instead of white bread, rice, and pasta).  Get enough calcium and vitamin D. Check the label on foods and aim for 100% of the RDA (recommended daily allowance).  Lifestyle  Exercise at least 150 minutes each week  (30 minutes a day, 5 days a week).  Do muscle strengthening activities 2 days a week. These help control your weight and prevent disease.  No smoking.  Wear sunscreen to prevent skin cancer.  Have a dental exam and cleaning every 6 months.  Yearly exams  See your health care team every year to talk about:  Any changes in your health.  Any medicines your care team has prescribed.  Preventive care, family planning, and ways to prevent chronic diseases.  Shots (vaccines)   HPV shots (up to age 26), if you've never had them before.  Hepatitis B shots (up to age 59), if you've never had them before.  COVID-19 shot: Get this shot when it's due.  Flu shot: Get a flu shot every year.  Tetanus shot: Get a tetanus shot every 10 years.  Pneumococcal, hepatitis A, and RSV shots: Ask your care team if you need these based on your risk.  Shingles shot (for age 50 and up)  General health tests  Diabetes screening:  Starting at age 35, Get screened for diabetes at least every 3 years.  If you are younger than age 35, ask your care team if you should be screened for diabetes.  Cholesterol test: At age 39, start having a cholesterol test every 5 years, or more often if advised.  Bone density scan (DEXA): At age 50, ask your care team if you should have this scan for osteoporosis (brittle bones).  Hepatitis C: Get tested at least once in your life.  STIs (sexually transmitted infections)  Before age 24: Ask your care team if you should be screened for STIs.  After age 24: Get screened for STIs if you're at risk. You are at risk for STIs (including HIV) if:  You are  sexually active with more than one person.  You don't use condoms every time.  You or a partner was diagnosed with a sexually transmitted infection.  If you are at risk for HIV, ask about PrEP medicine to prevent HIV.  Get tested for HIV at least once in your life, whether you are at risk for HIV or not.  Cancer screening tests  Cervical cancer screening: If you have a cervix, begin getting regular cervical cancer screening tests starting at age 21.  Breast cancer scan (mammogram): If you've ever had breasts, begin having regular mammograms starting at age 40. This is a scan to check for breast cancer.  Colon cancer screening: It is important to start screening for colon cancer at age 45.  Have a colonoscopy test every 10 years (or more often if you're at risk) Or, ask your provider about stool tests like a FIT test every year or Cologuard test every 3 years.  To learn more about your testing options, visit:   .  For help making a decision, visit:   https://bit.ly/bi92861.  Prostate cancer screening test: If you have a prostate, ask your care team if a prostate cancer screening test (PSA) at age 55 is right for you.  Lung cancer screening: If you are a current or former smoker ages 50 to 80, ask your care team if ongoing lung cancer screenings are right for you.  For informational purposes only. Not to replace the advice of your health care provider. Copyright   2023 [employer]. All rights reserved. Clinically reviewed by the [facility] Transitions Program. [employer] [id number] - REV 01/24.

## 2025-01-14 NOTE — PROGRESS NOTES
"Preventive Care Visit  [facility]  Erika Alan MD, Family Medicine  Jan 14, 2025      Assessment & Plan     Routine general medical examination at a health care facility  -colon cancer screening is up to date   - REVIEW OF HEALTH MAINTENANCE PROTOCOL ORDERS  - PRIMARY CARE FOLLOW-UP SCHEDULING    Hypertriglyceridemia  - Lipid panel reflex to direct LDL Non-fasting    Screening for HIV (human immunodeficiency virus)  -screening guidelines reviewed   - HIV Antigen Antibody Combo    Need for hepatitis C screening test  - Hepatitis C Screen Reflex to HCV RNA Quant and Genotype    Acute idiopathic gout, unspecified site  -stable on current medication   - Uric acid  - Basic metabolic panel  (Ca, Cl, CO2, Creat, Gluc, K, Na, BUN)  - allopurinol (ZYLOPRIM) 100 MG tablet  Dispense: 90 tablet; Refill: 3  - Uric acid  - Basic metabolic panel  (Ca, Cl, CO2, Creat, Gluc, K, Na, BUN)    AJ (obstructive sleep apnea)  -using CPAP     Lipid screening  -check lipid panel     Acute gouty arthritis  -refills provided   - indomethacin (INDOCIN) 50 MG capsule  Dispense: 60 capsule; Refill: 6    Vaccine refused by patient  -declined all vaccines today           BMI  Estimated body mass index is 26.33 kg/m  as calculated from the following:    Height as of this encounter: 1.854 m (6' 1\").    Weight as of this encounter: 90.5 kg (199 lb 9.6 oz).   Weight management plan: Discussed healthy diet and exercise guidelines    Counseling  Appropriate preventive services were addressed with this patient via screening, questionnaire, or discussion as appropriate for fall prevention, nutrition, physical activity, Tobacco-use cessation, social engagement, weight loss and cognition.  Checklist reviewing preventive services available has been given to the patient.  Reviewed patient's diet, addressing concerns and/or questions.   He is at risk for lack of exercise and has been provided with information to increase physical " activity for the benefit of his well-being.         Georgina Hubbard is a 49 year old, presenting for the following:  Physical (Also med refills)        1/14/2025     3:07 PM   Additional Questions   Roomed by Clara   Accompanied by self         1/14/2025     3:07 PM   Patient Reported Additional Medications   Patient reports taking the following new medications no        Wt Readings from Last 2 Encounters:   01/14/25 90.5 kg (199 lb 9.6 oz)   03/05/24 86.2 kg (190 lb)        HPI  Walking in the warmer weather  No tobacco  Alcohol use rare, less than 5 beers a week  AJ using CPAP+    Gout follow up:  Gout medication doing ok  No recent flare up  No GI side effects  No history of renal disease  No history of peptic ulcer disease       Health Care Directive  Patient does not have a Health Care Directive: Discussed advance care planning with patient; information given to patient to review.      1/9/2025   General Health   How would you rate your overall physical health? Good   Feel stress (tense, anxious, or unable to sleep) Only a little   (!) STRESS CONCERN      1/9/2025   Nutrition   Three or more servings of calcium each day? (!) I DON'T KNOW   Diet: Regular (no restrictions)   How many servings of fruit and vegetables per day? (!) 2-3   How many sweetened beverages each day? 0-1         1/9/2025   Exercise   Days per week of moderate/strenous exercise 2 days   Average minutes spent exercising at this level 50 min   (!) EXERCISE CONCERN      1/9/2025   Social Factors   Frequency of gathering with friends or relatives Once a week   Worry food won't last until get money to buy more No   Food not last or not have enough money for food? No   Do you have housing? (Housing is defined as stable permanent housing and does not include staying ouside in a car, in a tent, in an abandoned building, in an overnight shelter, or couch-surfing.) Yes   Are you worried about losing your housing? No   Lack of transportation? No    Unable to get utilities (heat,electricity)? No         1/9/2025   Dental   Dentist two times every year? Yes         1/9/2025   TB Screening   Were you born outside of the [place]? No         Today's PHQ-2 Score:       1/14/2025     3:01 PM   PHQ-2 ( 1999 [employer])   Q1: Little interest or pleasure in doing things 0   Q2: Feeling down, depressed or hopeless 0   PHQ-2 Score 0    Q1: Little interest or pleasure in doing things Not at all   Q2: Feeling down, depressed or hopeless Not at all   PHQ-2 Score 0       Patient-reported           1/9/2025   Substance Use   Alcohol more than 3/day or more than 7/wk No   Do you use any other substances recreationally? No     Social History     Tobacco Use    Smoking status: Never     Passive exposure: Never    Smokeless tobacco: Never   Vaping Use    Vaping status: Never Used   Substance Use Topics    Alcohol use: Yes     Comment: socially    Drug use: No             1/9/2025   One time HIV Screening   Previous HIV test? No         1/9/2025   STI Screening   New sexual partner(s) since last STI/HIV test? No   ASCVD Risk   The ASCVD Risk score (Miriam RINALDI, et al., 2019) failed to calculate for the following reasons:    Cannot find a previous HDL lab    Cannot find a previous total cholesterol lab        1/9/2025   Contraception/Family Planning   Questions about contraception or family planning No        Reviewed and updated as needed this visit by Provider                    Past Medical History:   Diagnosis Date    Allergic rhinitis     Infectious mononucleosis 11/14/2014    With hepatosplenomegaly on US 11/14/2014.  Improved on US 12/1/2014.     Past Surgical History:   Procedure Laterality Date    COLONOSCOPY WITH CO2 INSUFFLATION N/A 9/1/2023    Procedure: Colonoscopy with CO2 insufflation;  Surgeon: Aracely Krishnamurthy DO;  Location: [facility] OR     Lab work is in process  Labs reviewed in EPIC  BP Readings from Last 3 Encounters:   01/14/25 135/78   11/16/23 126/74   09/01/23  "126/84    Wt Readings from Last 3 Encounters:   01/14/25 90.5 kg (199 lb 9.6 oz)   03/05/24 86.2 kg (190 lb)   11/16/23 87.5 kg (193 lb)                  Patient Active Problem List   Diagnosis    Gout    Asymptomatic cholelithiasis    Hypertriglyceridemia    AJ (obstructive sleep apnea)     Past Surgical History:   Procedure Laterality Date    COLONOSCOPY WITH CO2 INSUFFLATION N/A 9/1/2023    Procedure: Colonoscopy with CO2 insufflation;  Surgeon: Aracely Krishnamurthy DO;  Location:  OR       Social History     Tobacco Use    Smoking status: Never     Passive exposure: Never    Smokeless tobacco: Never   Substance Use Topics    Alcohol use: Yes     Comment: socially     Family History   Problem Relation Age of Onset    Cancer Maternal Grandmother     Cancer Maternal Grandfather          Current Outpatient Medications   Medication Sig Dispense Refill    allopurinol (ZYLOPRIM) 100 MG tablet Take 1 tablet (100 mg) by mouth daily. 90 tablet 3    indomethacin (INDOCIN) 50 MG capsule TAKE ONE CAPSULE BY MOUTH THREE TIMES A DAY WITH MEALS 60 capsule 6     No Known Allergies      Review of Systems  Constitutional, HEENT, cardiovascular, pulmonary, gi and gu systems are negative, except as otherwise noted.     Objective    Exam  /78 (BP Location: Left arm, Patient Position: Sitting, Cuff Size: Adult Large)   Pulse 70   Temp 97.5  F (36.4  C) (Oral)   Resp 17   Ht 1.854 m (6' 1\")   Wt 90.5 kg (199 lb 9.6 oz)   SpO2 98%   BMI 26.33 kg/m     Estimated body mass index is 26.33 kg/m  as calculated from the following:    Height as of this encounter: 1.854 m (6' 1\").    Weight as of this encounter: 90.5 kg (199 lb 9.6 oz).    Physical Exam  GENERAL APPEARANCE: healthy, alert and no distress  EYES: Eyes grossly normal to inspection and conjunctivae and sclerae normal  NECK: no adenopathy and trachea midline and normal to palpation  RESP: lungs clear to auscultation - no rales, rhonchi or wheezes  CV: regular rates and " rhythm, normal S1 S2, no S3 or S4 and no murmur, click or rub  ABDOMEN: soft, non-tender and no rebound or guarding   MS: extremities normal- no gross deformities noted and peripheral pulses normal  NEURO: Normal strength and tone, mentation intact and speech normal  PSYCH: mentation appears normal          Signed Electronically by: Erika Alan MD

## 2025-01-15 LAB
ANION GAP SERPL CALCULATED.3IONS-SCNC: 11 MMOL/L (ref 7–15)
BUN SERPL-MCNC: 26.4 MG/DL (ref 6–20)
CALCIUM SERPL-MCNC: 10 MG/DL (ref 8.8–10.4)
CHLORIDE SERPL-SCNC: 104 MMOL/L (ref 98–107)
CHOLEST SERPL-MCNC: 271 MG/DL
CREAT SERPL-MCNC: 1.34 MG/DL (ref 0.67–1.17)
EGFRCR SERPLBLD CKD-EPI 2021: 65 ML/MIN/1.73M2
FASTING STATUS PATIENT QL REPORTED: NO
FASTING STATUS PATIENT QL REPORTED: NO
GLUCOSE SERPL-MCNC: 83 MG/DL (ref 70–99)
HCO3 SERPL-SCNC: 25 MMOL/L (ref 22–29)
HCV AB SERPL QL IA: NONREACTIVE
HDLC SERPL-MCNC: 30 MG/DL
HIV 1+2 AB+HIV1 P24 AG SERPL QL IA: NONREACTIVE
LDLC SERPL CALC-MCNC: ABNORMAL MG/DL
LDLC SERPL DIRECT ASSAY-MCNC: 155 MG/DL
NONHDLC SERPL-MCNC: 241 MG/DL
POTASSIUM SERPL-SCNC: 4.4 MMOL/L (ref 3.4–5.3)
SODIUM SERPL-SCNC: 140 MMOL/L (ref 135–145)
TRIGL SERPL-MCNC: 622 MG/DL
URATE SERPL-MCNC: 8.5 MG/DL (ref 3.4–7)

## 2025-01-15 NOTE — RESULT ENCOUNTER NOTE
Dear Stevie Black    Here are your cholesterol results:    Your LDL is: Lab Results       Component                Value               Date                       LDL                      155                 01/14/2025          Your LDL goal is to be less than 160  Your HDL is: Lab Results       Component                Value               Date                       HDL                      30                  01/14/2025           Goal HDL is Greater than 40 (for men) or 50 (for women).  Your Triglycerides are: Lab Results       Component                Value               Date                       TRIG                     622                 01/14/2025          Goal TRIGLYCERIDES are less than 150.     Based on your cholesterol your 10 year heart attack risk is The 10-year ASCVD risk score (Miriam RINALDI, et al., 2019) is: 9.2%    Values used to calculate the score:      Age: 49 years      Sex: Male      Is Non- : No      Diabetic: No      Tobacco smoker: No      Systolic Blood Pressure: 135 mmHg      Is BP treated: No      HDL Cholesterol: 30 mg/dL      Total Cholesterol: 271 mg/dL    Since your risk score is over 7.5% you would benefit from cholesterol lowering medication called statins.     Here are some ways to improve your cholesterol:    Try to get at least 45 minutes of aerobic exercise 5-6 days a week  Maintain a healthy body weight  Eat less saturated fats  Buy lean cuts of meat, reduce your portions of red meat or substitute poultry or fish  Avoid fried or fast foods that are high in fat  Eat more fruits and vegetables    Uric acid levels for gout are elevated, please make an appointment to discuss increasing the Allopurinol and cholesterol medication.     Electrolytes and glucose are normal.  Kidney function is borderline elevated, stay well hydrated and reduce use of Ibuprofen.  We can recheck this in 4-6 months.       Please do not hesitate to call us at [phone] if you  have any questions or concerns.    Thank you,    Erika Alan MD MPH

## 2025-04-10 ENCOUNTER — E-VISIT (OUTPATIENT)
Dept: FAMILY MEDICINE | Facility: CLINIC | Age: 50
End: 2025-04-10
Payer: COMMERCIAL

## 2025-04-24 DIAGNOSIS — M10.00 ACUTE IDIOPATHIC GOUT, UNSPECIFIED SITE: ICD-10-CM

## 2025-04-24 RX ORDER — ALLOPURINOL 300 MG/1
300 TABLET ORAL DAILY
Qty: 90 TABLET | Refills: 1 | Status: SHIPPED | OUTPATIENT
Start: 2025-04-24

## 2025-05-05 ENCOUNTER — LAB (OUTPATIENT)
Dept: LAB | Facility: CLINIC | Age: 50
End: 2025-05-05
Payer: COMMERCIAL

## 2025-05-05 DIAGNOSIS — M10.00 ACUTE IDIOPATHIC GOUT, UNSPECIFIED SITE: ICD-10-CM

## 2025-05-05 PROCEDURE — 36415 COLL VENOUS BLD VENIPUNCTURE: CPT

## 2025-05-05 PROCEDURE — 84550 ASSAY OF BLOOD/URIC ACID: CPT

## 2025-05-06 LAB — URATE SERPL-MCNC: 4.9 MG/DL (ref 3.4–7)

## 2025-05-06 NOTE — RESULT ENCOUNTER NOTE
Stevie,     Uric acid level is at goal below 5.   Continue current dose of Allopurinol.  Please reduce Colchicine to 0.6 mg once daily for 60 days then stop Colchicine.         Please do not hesitate to call us at [phone] if you have any questions or concerns.    Thank you,    Erika Alan MD MPH

## 2025-06-04 DIAGNOSIS — M10.00 ACUTE IDIOPATHIC GOUT, UNSPECIFIED SITE: ICD-10-CM

## 2025-06-04 RX ORDER — COLCHICINE 0.6 MG/1
0.6 TABLET ORAL DAILY
Qty: 60 TABLET | Refills: 0 | Status: SHIPPED | OUTPATIENT
Start: 2025-06-04

## (undated) DEVICE — PREP CHLORAPREP 26ML TINTED ORANGE  260815

## (undated) DEVICE — PAD CHUX UNDERPAD 23X24" 7136

## (undated) DEVICE — KIT ENDO FIRST STEP DISINFECTANT 200ML W/POUCH EP-4

## (undated) RX ORDER — SIMETHICONE 40MG/0.6ML
SUSPENSION, DROPS(FINAL DOSAGE FORM)(ML) ORAL
Status: DISPENSED
Start: 2023-09-01

## (undated) RX ORDER — DIPHENHYDRAMINE HYDROCHLORIDE 50 MG/ML
INJECTION INTRAMUSCULAR; INTRAVENOUS
Status: DISPENSED
Start: 2023-09-01

## (undated) RX ORDER — FENTANYL CITRATE 50 UG/ML
INJECTION, SOLUTION INTRAMUSCULAR; INTRAVENOUS
Status: DISPENSED
Start: 2023-09-01